# Patient Record
Sex: FEMALE | Race: WHITE | NOT HISPANIC OR LATINO
[De-identification: names, ages, dates, MRNs, and addresses within clinical notes are randomized per-mention and may not be internally consistent; named-entity substitution may affect disease eponyms.]

---

## 2017-07-03 ENCOUNTER — RX ONLY (OUTPATIENT)
Age: 22
Setting detail: RX ONLY
End: 2017-07-03

## 2017-07-03 PROBLEM — L70.0 ACNE VULGARIS: Status: ACTIVE | Noted: 2017-07-03

## 2018-01-01 ENCOUNTER — OFFICE VISIT (OUTPATIENT)
Dept: URGENT CARE | Facility: CLINIC | Age: 23
End: 2018-01-01
Payer: COMMERCIAL

## 2018-01-01 VITALS
BODY MASS INDEX: 20.32 KG/M2 | HEART RATE: 102 BPM | HEIGHT: 64 IN | RESPIRATION RATE: 14 BRPM | SYSTOLIC BLOOD PRESSURE: 106 MMHG | TEMPERATURE: 100 F | OXYGEN SATURATION: 98 % | WEIGHT: 119 LBS | DIASTOLIC BLOOD PRESSURE: 64 MMHG

## 2018-01-01 DIAGNOSIS — J11.1 INFLUENZA: ICD-10-CM

## 2018-01-01 LAB
APPEARANCE UR: NORMAL
BILIRUB UR STRIP-MCNC: NORMAL MG/DL
COLOR UR AUTO: NORMAL
FLUAV+FLUBV AG SPEC QL IA: NORMAL
GLUCOSE UR STRIP.AUTO-MCNC: NORMAL MG/DL
INT CON NEG: NEGATIVE
INT CON POS: POSITIVE
KETONES UR STRIP.AUTO-MCNC: NORMAL MG/DL
LEUKOCYTE ESTERASE UR QL STRIP.AUTO: NORMAL
NITRITE UR QL STRIP.AUTO: NORMAL
PH UR STRIP.AUTO: 6 [PH] (ref 5–8)
PROT UR QL STRIP: NORMAL MG/DL
RBC UR QL AUTO: NORMAL
SP GR UR STRIP.AUTO: 1.02
UROBILINOGEN UR STRIP-MCNC: 0.2 MG/DL

## 2018-01-01 PROCEDURE — 81002 URINALYSIS NONAUTO W/O SCOPE: CPT | Performed by: PHYSICIAN ASSISTANT

## 2018-01-01 PROCEDURE — 99214 OFFICE O/P EST MOD 30 MIN: CPT | Performed by: PHYSICIAN ASSISTANT

## 2018-01-01 PROCEDURE — 87804 INFLUENZA ASSAY W/OPTIC: CPT | Performed by: PHYSICIAN ASSISTANT

## 2018-01-01 RX ORDER — IBUPROFEN 200 MG
200 TABLET ORAL EVERY 6 HOURS PRN
COMMUNITY
End: 2020-10-09

## 2018-01-01 RX ORDER — OSELTAMIVIR PHOSPHATE 75 MG/1
75 CAPSULE ORAL 2 TIMES DAILY
Qty: 10 CAP | Refills: 0 | Status: SHIPPED | OUTPATIENT
Start: 2018-01-01 | End: 2018-01-06

## 2018-01-01 ASSESSMENT — ENCOUNTER SYMPTOMS
COUGH: 1
SPUTUM PRODUCTION: 1
HEADACHES: 1
WHEEZING: 0
HEMOPTYSIS: 0
SINUS PAIN: 1
FEVER: 0
CHILLS: 1
FLANK PAIN: 1
SHORTNESS OF BREATH: 0
SORE THROAT: 1
PALPITATIONS: 0

## 2018-01-01 NOTE — LETTER
January 1, 2018         Patient: Maria E Cisneros   YOB: 1995   Date of Visit: 1/1/2018           To Whom it May Concern:    Maria E Cisneros was seen in my clinic on 1/1/2018.  Please excuse her from work 1/3-1/4/2018    If you have any questions or concerns, please don't hesitate to call.        Sincerely,           Tayo Garcia P.A.-C.  Electronically Signed

## 2018-01-02 NOTE — PATIENT INSTRUCTIONS
"Influenza, Adult  Influenza (\"the flu\") is a viral infection of the respiratory tract. It occurs more often in winter months because people spend more time in close contact with one another. Influenza can make you feel very sick. Influenza easily spreads from person to person (contagious).  CAUSES   Influenza is caused by a virus that infects the respiratory tract. You can catch the virus by breathing in droplets from an infected person's cough or sneeze. You can also catch the virus by touching something that was recently contaminated with the virus and then touching your mouth, nose, or eyes.  RISKS AND COMPLICATIONS  You may be at risk for a more severe case of influenza if you smoke cigarettes, have diabetes, have chronic heart disease (such as heart failure) or lung disease (such as asthma), or if you have a weakened immune system. Elderly people and pregnant women are also at risk for more serious infections. The most common problem of influenza is a lung infection (pneumonia). Sometimes, this problem can require emergency medical care and may be life threatening.  SIGNS AND SYMPTOMS   Symptoms typically last 4 to 10 days and may include:  · Fever.  · Chills.  · Headache, body aches, and muscle aches.  · Sore throat.  · Chest discomfort and cough.  · Poor appetite.  · Weakness or feeling tired.  · Dizziness.  · Nausea or vomiting.  DIAGNOSIS   Diagnosis of influenza is often made based on your history and a physical exam. A nose or throat swab test can be done to confirm the diagnosis.  TREATMENT   In mild cases, influenza goes away on its own. Treatment is directed at relieving symptoms. For more severe cases, your health care provider may prescribe antiviral medicines to shorten the sickness. Antibiotic medicines are not effective because the infection is caused by a virus, not by bacteria.  HOME CARE INSTRUCTIONS  · Take medicines only as directed by your health care provider.  · Use a cool mist humidifier " to make breathing easier.  · Get plenty of rest until your temperature returns to normal. This usually takes 3 to 4 days.  · Drink enough fluid to keep your urine clear or pale yellow.  · Cover your mouth and nose when coughing or sneezing, and wash your hands well to prevent the virus from spreading.  · Stay home from work or school until the fever is gone for at least 1 full day.  PREVENTION   An annual influenza vaccination (flu shot) is the best way to avoid getting influenza. An annual flu shot is now routinely recommended for all adults in the [place]  SEEK MEDICAL CARE IF:  · You experience chest pain, your cough worsens, or you produce more mucus.  · You have nausea, vomiting, or diarrhea.  · Your fever returns or gets worse.  SEEK IMMEDIATE MEDICAL CARE IF:  · You have trouble breathing, you become short of breath, or your skin or nails become bluish.  · You have severe pain or stiffness in the neck.  · You develop a sudden headache, or pain in the face or ear.  · You have nausea or vomiting that you cannot control.  MAKE SURE YOU:   · Understand these instructions.  · Will watch your condition.  · Will get help right away if you are not doing well or get worse.     This information is not intended to replace advice given to you by your health care provider. Make sure you discuss any questions you have with your health care provider.     Document Released: 12/15/2001 Document Revised: 01/08/2016 Document Reviewed: 03/18/2013  [employer] Interactive Patient Education ©2016 [employer]

## 2018-01-02 NOTE — PROGRESS NOTES
Subjective:      Maria E Cisneros is a 22 y.o. female who presents with URI (uri symptoms x 3 days . )            URI    This is a new problem. The current episode started yesterday. The problem has been unchanged. The maximum temperature recorded prior to her arrival was 100.4 - 100.9 F. Associated symptoms include congestion, coughing, headaches, joint pain, sinus pain, sneezing and a sore throat. Pertinent negatives include no chest pain, ear pain or wheezing. She has tried decongestant for the symptoms. The treatment provided no relief.       Review of Systems   Constitutional: Positive for chills and malaise/fatigue. Negative for fever.   HENT: Positive for congestion, sinus pain, sneezing and sore throat. Negative for ear pain.    Respiratory: Positive for cough and sputum production. Negative for hemoptysis, shortness of breath and wheezing.    Cardiovascular: Negative for chest pain and palpitations.   Genitourinary: Positive for flank pain.   Musculoskeletal: Positive for joint pain.   Neurological: Positive for headaches.   All other systems reviewed and are negative.    PMH:  has a past medical history of Depression. She also has no past medical history of Diabetes.  MEDS:   Current Outpatient Prescriptions:   •  ALBUTEROL INH, Inhale  by mouth., Disp: , Rfl:   •  ibuprofen (MOTRIN) 200 MG Tab, Take 200 mg by mouth every 6 hours as needed., Disp: , Rfl:   •  Levonorgestrel (MIRENA, 52 MG, IU), by Intrauterine route., Disp: , Rfl:   •  fluoxetine (PROZAC) 20 MG CAPS, Take 20 mg by mouth every day., Disp: , Rfl:   •  benzonatate (TESSALON) 100 MG Cap, Take 1 Cap by mouth 3 times a day as needed for Cough., Disp: 60 Cap, Rfl: 0  •  azithromycin (ZITHROMAX) 250 MG Tab, uad, Disp: 6 Tab, Rfl: 0  •  fluoxetine (PROZAC) 10 MG CAPS, Take 60 mg by mouth every day., Disp: , Rfl:   •  clonazepam (KLONOPIN) 0.5 MG TABS, Take 0.25 mg by mouth 2 times a day., Disp: , Rfl:   •  Non Formulary Request, Pt. Is also taking  "flurocortizone, Disp: , Rfl:   •  sertraline (ZOLOFT) 100 MG TABS, Take 100 mg by mouth every day.  , Disp: , Rfl:   •  theophylline SR (THEODUR) 200 MG TB12, Take 200 mg by mouth 2 times a day.  , Disp: , Rfl:   •  lorazepam (ATIVAN) 0.5 MG TABS, Take 1 mg by mouth every four hours as needed.  , Disp: , Rfl:   ALLERGIES:   Allergies   Allergen Reactions   • Codeine Vomiting     SURGHX: History reviewed. No pertinent surgical history.  SOCHX:  reports that she has never smoked. She has never used smokeless tobacco. She reports that she does not drink alcohol or use drugs.  FH: Family history was reviewed, no pertinent findings to report  Medications, Allergies, and current problem list reviewed today in Epic     Objective:     /64   Pulse (!) 102   Temp 37.8 °C (100 °F)   Resp 14   Ht 1.626 m (5' 4\")   Wt 54 kg (119 lb)   LMP 01/01/2016   SpO2 98%   BMI 20.43 kg/m²      Physical Exam   Constitutional: She is oriented to person, place, and time. She appears well-developed and well-nourished. She is active.  Non-toxic appearance. She does not have a sickly appearance. She does not appear ill. No distress. She is not intubated.   HENT:   Head: Normocephalic and atraumatic.   Right Ear: Hearing, tympanic membrane, external ear and ear canal normal.   Left Ear: Hearing, tympanic membrane, external ear and ear canal normal.   Nose: Nose normal.   Mouth/Throat: Uvula is midline, oropharynx is clear and moist and mucous membranes are normal.   Eyes: Conjunctivae, EOM and lids are normal.   Neck: Normal range of motion. Neck supple.   Cardiovascular: Regular rhythm, S1 normal, S2 normal and normal heart sounds.  Exam reveals no gallop and no friction rub.    No murmur heard.  Pulmonary/Chest: Effort normal and breath sounds normal. No accessory muscle usage. No apnea, no tachypnea and no bradypnea. She is not intubated. No respiratory distress. She has no decreased breath sounds. She has no wheezes. She has no " rhonchi. She has no rales. She exhibits no tenderness.   Musculoskeletal: Normal range of motion.   Neurological: She is alert and oriented to person, place, and time.   Skin: Skin is warm and dry.   Psychiatric: She has a normal mood and affect. Her speech is normal and behavior is normal. Judgment and thought content normal.   Vitals reviewed.           Rapid Flu: POS A  Assessment/Plan:     1. Influenza  POCT Influenza A/B    POCT Urinalysis    oseltamivir (TAMIFLU) 75 MG Cap         Differential diagnosis, natural history, supportive care, and indications for immediate follow-up discussed at length.   Follow-up with primary care provider within 4-5 days, emergency room precautions discussed.  Patient and/or family appears understanding of information.

## 2019-03-28 ENCOUNTER — OFFICE VISIT (OUTPATIENT)
Dept: URGENT CARE | Facility: CLINIC | Age: 24
End: 2019-03-28
Payer: COMMERCIAL

## 2019-03-28 VITALS
TEMPERATURE: 97.8 F | DIASTOLIC BLOOD PRESSURE: 82 MMHG | RESPIRATION RATE: 12 BRPM | HEIGHT: 64 IN | HEART RATE: 84 BPM | SYSTOLIC BLOOD PRESSURE: 120 MMHG | WEIGHT: 126.2 LBS | BODY MASS INDEX: 21.54 KG/M2 | OXYGEN SATURATION: 99 %

## 2019-03-28 DIAGNOSIS — J01.00 ACUTE NON-RECURRENT MAXILLARY SINUSITIS: ICD-10-CM

## 2019-03-28 PROCEDURE — 99214 OFFICE O/P EST MOD 30 MIN: CPT | Performed by: PHYSICIAN ASSISTANT

## 2019-03-28 RX ORDER — AMOXICILLIN AND CLAVULANATE POTASSIUM 875; 125 MG/1; MG/1
1 TABLET, FILM COATED ORAL 2 TIMES DAILY
Qty: 20 TAB | Refills: 0 | Status: SHIPPED | OUTPATIENT
Start: 2019-03-28 | End: 2019-04-07

## 2019-03-28 ASSESSMENT — ENCOUNTER SYMPTOMS
FEVER: 0
NAUSEA: 0
DIARRHEA: 0
CHILLS: 0
SINUS PAIN: 1
EYE DISCHARGE: 0
HEADACHES: 1
SINUS PRESSURE: 1
SORE THROAT: 1
VOMITING: 0
MYALGIAS: 0
COUGH: 0
ABDOMINAL PAIN: 0
CONSTIPATION: 0
SHORTNESS OF BREATH: 0
DIAPHORESIS: 0
EYE PAIN: 0

## 2019-03-28 NOTE — LETTER
March 28, 2019         Patient: Maria E Cisneros   YOB: 1995   Date of Visit: 3/28/2019           To Whom it May Concern:    Maria E Cisneros was seen in my clinic on 3/28/2019. Please excuse her from work on 3/28/19.    If you have any questions or concerns, please don't hesitate to call.        Sincerely,           Sanchez Aguilera  Electronically Signed

## 2019-03-28 NOTE — PROGRESS NOTES
Subjective:   Maria E Cisneros is a 23 y.o. female who presents for Pharyngitis (X congested sore throat, diff breathing )       Sinus Problem   This is a new problem. Episode onset: 1 week ago. The problem has been gradually worsening since onset. There has been no fever. The pain is moderate. Associated symptoms include congestion, headaches, sinus pressure and a sore throat. Pertinent negatives include no chills, coughing, diaphoresis, ear pain or shortness of breath. Past treatments include spray decongestants. The treatment provided mild relief.     Review of Systems   Constitutional: Negative for chills, diaphoresis and fever.   HENT: Positive for congestion, sinus pain, sinus pressure and sore throat. Negative for ear discharge and ear pain.    Eyes: Negative for pain and discharge.   Respiratory: Negative for cough and shortness of breath.    Cardiovascular: Negative for chest pain.   Gastrointestinal: Negative for abdominal pain, constipation, diarrhea, nausea and vomiting.   Musculoskeletal: Negative for myalgias.   Neurological: Positive for headaches.   All other systems reviewed and are negative.      PMH:  has a past medical history of Depression. She also has no past medical history of Diabetes.    MEDS:   Current Outpatient Prescriptions:   •  amoxicillin-clavulanate (AUGMENTIN) 875-125 MG Tab, Take 1 Tab by mouth 2 times a day for 10 days., Disp: 20 Tab, Rfl: 0  •  fluoxetine (PROZAC) 10 MG CAPS, Take 60 mg by mouth every day., Disp: , Rfl:   •  clonazepam (KLONOPIN) 0.5 MG TABS, Take 0.25 mg by mouth 2 times a day., Disp: , Rfl:   •  ALBUTEROL INH, Inhale  by mouth., Disp: , Rfl:   •  ibuprofen (MOTRIN) 200 MG Tab, Take 200 mg by mouth every 6 hours as needed., Disp: , Rfl:   •  Levonorgestrel (MIRENA, 52 MG, IU), by Intrauterine route., Disp: , Rfl:   •  benzonatate (TESSALON) 100 MG Cap, Take 1 Cap by mouth 3 times a day as needed for Cough. (Patient not taking: Reported on 3/28/2019), Disp: 60  "Cap, Rfl: 0  •  azithromycin (ZITHROMAX) 250 MG Tab, uad (Patient not taking: Reported on 3/28/2019), Disp: 6 Tab, Rfl: 0  •  fluoxetine (PROZAC) 20 MG CAPS, Take 20 mg by mouth every day., Disp: , Rfl:   •  Non Formulary Request, Pt. Is also taking flurocortizone, Disp: , Rfl:   •  sertraline (ZOLOFT) 100 MG TABS, Take 100 mg by mouth every day.  , Disp: , Rfl:   •  theophylline SR (THEODUR) 200 MG TB12, Take 200 mg by mouth 2 times a day.  , Disp: , Rfl:   •  lorazepam (ATIVAN) 0.5 MG TABS, Take 1 mg by mouth every four hours as needed.  , Disp: , Rfl:     ALLERGIES:   Allergies   Allergen Reactions   • Codeine Vomiting       SURGHX: No past surgical history on file.    SOCHX:  reports that she has never smoked. She has never used smokeless tobacco. She reports that she does not drink alcohol or use drugs.    FH: Reviewed with patient, not pertinent to this visit.     Objective:   /82 (BP Location: Left arm, Patient Position: Sitting, BP Cuff Size: Adult)   Pulse 84   Temp 36.6 °C (97.8 °F) (Temporal)   Resp 12   Ht 1.626 m (5' 4\")   Wt 57.2 kg (126 lb 3.2 oz)   SpO2 99%   BMI 21.66 kg/m²   Physical Exam   Constitutional: She is oriented to person, place, and time. She appears well-developed and well-nourished. No distress.   HENT:   Head: Normocephalic and atraumatic.   Right Ear: Tympanic membrane, external ear and ear canal normal.   Left Ear: Tympanic membrane, external ear and ear canal normal.   Nose: Mucosal edema present. Right sinus exhibits maxillary sinus tenderness. Left sinus exhibits maxillary sinus tenderness.   Mouth/Throat: Uvula is midline and mucous membranes are normal. Posterior oropharyngeal edema and posterior oropharyngeal erythema present. No oropharyngeal exudate.   Eyes: Pupils are equal, round, and reactive to light. Conjunctivae and EOM are normal.   Neck: Normal range of motion. Neck supple. No tracheal deviation present.   Cardiovascular: Normal rate, regular rhythm and " normal heart sounds.    Pulmonary/Chest: Effort normal and breath sounds normal. No respiratory distress. She has no wheezes. She has no rhonchi. She has no rales.   Musculoskeletal:   ROM normal all four extremities   Lymphadenopathy:     She has no cervical adenopathy.   Neurological: She is alert and oriented to person, place, and time.   Skin: Skin is warm and dry.   Psychiatric: She has a normal mood and affect. Her behavior is normal. Judgment and thought content normal.   Vitals reviewed.      Assessment/Plan:   1. Acute non-recurrent maxillary sinusitis  - amoxicillin-clavulanate (AUGMENTIN) 875-125 MG Tab; Take 1 Tab by mouth 2 times a day for 10 days.  Dispense: 20 Tab; Refill: 0    - Advised to try OTC steroid nasal spray, saline nasal flushes, ibuprofen/acetaminophen prn  - Advised to take abx with food/yogurt and to complete course  - Advised to return if symptoms worsen or do not improve    Differential diagnosis, natural history, supportive care, and indications for immediate follow-up discussed.

## 2020-02-18 ENCOUNTER — OFFICE VISIT (OUTPATIENT)
Dept: URGENT CARE | Facility: CLINIC | Age: 25
End: 2020-02-18
Payer: COMMERCIAL

## 2020-02-18 VITALS
SYSTOLIC BLOOD PRESSURE: 120 MMHG | DIASTOLIC BLOOD PRESSURE: 70 MMHG | TEMPERATURE: 99.3 F | BODY MASS INDEX: 22.32 KG/M2 | HEART RATE: 88 BPM | HEIGHT: 63 IN | OXYGEN SATURATION: 97 % | RESPIRATION RATE: 16 BRPM | WEIGHT: 126 LBS

## 2020-02-18 DIAGNOSIS — J06.9 URI WITH COUGH AND CONGESTION: ICD-10-CM

## 2020-02-18 DIAGNOSIS — J10.1 INFLUENZA A: Primary | ICD-10-CM

## 2020-02-18 PROBLEM — H90.5 SENSORINEURAL HEARING LOSS: Status: ACTIVE | Noted: 2020-02-18

## 2020-02-18 PROBLEM — J45.909 EXTRINSIC ASTHMA: Status: ACTIVE | Noted: 2020-02-18

## 2020-02-18 LAB
FLUAV+FLUBV AG SPEC QL IA: NORMAL
INT CON NEG: NORMAL
INT CON POS: NORMAL

## 2020-02-18 PROCEDURE — 99214 OFFICE O/P EST MOD 30 MIN: CPT | Performed by: PHYSICIAN ASSISTANT

## 2020-02-18 PROCEDURE — 87804 INFLUENZA ASSAY W/OPTIC: CPT | Performed by: PHYSICIAN ASSISTANT

## 2020-02-18 RX ORDER — DEXTROMETHORPHAN HYDROBROMIDE AND PROMETHAZINE HYDROCHLORIDE 15; 6.25 MG/5ML; MG/5ML
5 SYRUP ORAL EVERY 4 HOURS PRN
Qty: 120 ML | Refills: 0 | Status: SHIPPED | OUTPATIENT
Start: 2020-02-18 | End: 2020-10-09

## 2020-02-18 RX ORDER — AZELASTINE 1 MG/ML
SPRAY, METERED NASAL
COMMUNITY
Start: 2020-01-17 | End: 2020-10-09

## 2020-02-18 RX ORDER — ALBUTEROL SULFATE 90 UG/1
1-2 AEROSOL, METERED RESPIRATORY (INHALATION)
COMMUNITY
Start: 2013-12-26 | End: 2020-10-09

## 2020-02-18 RX ORDER — METHYLPREDNISOLONE 4 MG/1
TABLET ORAL
Qty: 21 TAB | Refills: 0 | Status: SHIPPED | OUTPATIENT
Start: 2020-02-18 | End: 2020-10-09

## 2020-02-18 RX ORDER — OSELTAMIVIR PHOSPHATE 75 MG/1
75 CAPSULE ORAL 2 TIMES DAILY
Qty: 10 CAP | Refills: 0 | Status: SHIPPED | OUTPATIENT
Start: 2020-02-18 | End: 2020-02-23

## 2020-02-18 NOTE — LETTER
February 18, 2020       Patient: Maria E Cisneros   YOB: 1995   Date of Visit: 2/18/2020         To Whom It May Concern:    It is my medical opinion that Maria E Cisneros may be excused from work for the dates of 2/18/20-2/21/20.      If you have any questions or concerns, please don't hesitate to call [phone]          Sincerely,          Jose Mayes P.A.-C.  Electronically Signed

## 2020-02-19 NOTE — PROGRESS NOTES
Subjective:      Pt is a 24 y.o. female who presents with Pharyngitis (coughing)            HPI  This is a new problem. PT presents to  clinic today complaining of sore throat, watery eyes, pressure in ears, cough, fatigue, runny nose, post nasal drip, sinus pressure and headache. PT denies CP, SOB, NVD, abdominal pain, joint pain. PT states these symptoms began around 2 weeks ago. PT states the pain is a 7/10, aching in nature and worse at night.  Pt has not taken any RX medications for this condition. The pt's medication list, problem list, and allergies have been evaluated and reviewed during today's visit.    PMH:  Past Medical History:   Diagnosis Date   • Depression        PSH:  Negative per pt.      Fam Hx:  the patient's family history is not pertinent to their current complaint    Soc HX:  Social History     Socioeconomic History   • Marital status: Single     Spouse name: Not on file   • Number of children: Not on file   • Years of education: Not on file   • Highest education level: Not on file   Occupational History   • Not on file   Social Needs   • Financial resource strain: Not on file   • Food insecurity     Worry: Not on file     Inability: Not on file   • Transportation needs     Medical: Not on file     Non-medical: Not on file   Tobacco Use   • Smoking status: Never Smoker   • Smokeless tobacco: Never Used   Substance and Sexual Activity   • Alcohol use: No   • Drug use: No   • Sexual activity: Not on file   Lifestyle   • Physical activity     Days per week: Not on file     Minutes per session: Not on file   • Stress: Not on file   Relationships   • Social connections     Talks on phone: Not on file     Gets together: Not on file     Attends Sikh service: Not on file     Active member of club or organization: Not on file     Attends meetings of clubs or organizations: Not on file     Relationship status: Not on file   • Intimate partner violence     Fear of current or ex partner: Not on  file     Emotionally abused: Not on file     Physically abused: Not on file     Forced sexual activity: Not on file   Other Topics Concern   • Not on file   Social History Narrative    ** Merged History Encounter **              Medications:    Current Outpatient Medications:   •  Norgestim-Eth Estrad Triphasic (TRI-SPRINTEC PO), Take  by mouth., Disp: , Rfl:   •  ibuprofen (MOTRIN) 200 MG Tab, Take 200 mg by mouth every 6 hours as needed., Disp: , Rfl:   •  fluoxetine (PROZAC) 20 MG CAPS, Take 20 mg by mouth every day., Disp: , Rfl:   •  ALBUTEROL INH, Inhale  by mouth., Disp: , Rfl:   •  Levonorgestrel (MIRENA, 52 MG, IU), by Intrauterine route., Disp: , Rfl:   •  benzonatate (TESSALON) 100 MG Cap, Take 1 Cap by mouth 3 times a day as needed for Cough. (Patient not taking: Reported on 3/28/2019), Disp: 60 Cap, Rfl: 0  •  azithromycin (ZITHROMAX) 250 MG Tab, uad (Patient not taking: Reported on 3/28/2019), Disp: 6 Tab, Rfl: 0  •  fluoxetine (PROZAC) 10 MG CAPS, Take 60 mg by mouth every day., Disp: , Rfl:   •  clonazepam (KLONOPIN) 0.5 MG TABS, Take 0.25 mg by mouth 2 times a day., Disp: , Rfl:   •  Non Formulary Request, Pt. Is also taking flurocortizone, Disp: , Rfl:   •  sertraline (ZOLOFT) 100 MG TABS, Take 100 mg by mouth every day.  , Disp: , Rfl:   •  theophylline SR (THEODUR) 200 MG TB12, Take 200 mg by mouth 2 times a day.  , Disp: , Rfl:   •  lorazepam (ATIVAN) 0.5 MG TABS, Take 1 mg by mouth every four hours as needed.  , Disp: , Rfl:       Allergies:  Codeine          ROS  Review of Systems   Constitutional: Positive for malaise/fatigue. Negative for fever.   HENT: Positive for congestion and sore throat from postnasal drip with associated sinus pressure and fullness.    Eyes: Negative for blurred vision, double vision and photophobia.   Respiratory: Positive for cough and sputum production. Negative for hemoptysis, shortness of breath and wheezing.    Cardiovascular: Negative for chest pain and  palpitations.   Gastrointestinal: Negative for nausea, vomiting, abdominal pain, diarrhea and constipation.   Genitourinary: Negative for dysuria and flank pain.   Musculoskeletal: Negative for joint pain and myalgias.   Skin: Negative for itching and rash.   Neurological: Positive for headaches. Negative for dizziness and tingling.   Endo/Heme/Allergies: Does not bruise/bleed easily.   Psychiatric/Behavioral: Negative for depression. The patient is not nervous/anxious.           Objective:     Wt 57.2 kg (126 lb)   BMI 21.63 kg/m²      Physical Exam        Physical Exam   Constitutional: Pt is oriented to person, place, and time. Pt appears well-developed and well-nourished. No distress.   HENT:   Head: Normocephalic and atraumatic.   Right Ear: Hearing, tympanic membrane, external ear and ear canal normal.   Left Ear: Hearing, tympanic membrane, external ear and ear canal normal.   Nose: Mucosal edema, rhinorrhea and sinus tenderness present. No nose lacerations, nasal deformity, septal deviation or nasal septal hematoma. No epistaxis.  No foreign bodies. Right sinus exhibits maxillary sinus tenderness and frontal sinus tenderness. Left sinus exhibits maxillary sinus tenderness and frontal sinus tenderness.   Mouth/Throat: Oropharynx is clear and moist. No oropharyngeal exudate.   Eyes: Conjunctivae normal and EOM are normal. Pupils are equal, round, and reactive to light. Right eye exhibits no discharge. Left eye exhibits no discharge.   Neck: Normal range of motion. Neck supple. No tracheal deviation present. No thyromegaly present.   Cardiovascular: Normal rate, regular rhythm, normal heart sounds and intact distal pulses.  Exam reveals no gallop and no friction rub.    No murmur heard.  Pulmonary/Chest: Effort normal and breath sounds normal. No respiratory distress. Pt has no wheezes. Pt has no rales. Pt exhibits no tenderness.   Abdominal: Soft. Bowel sounds are normal. Pt exhibits no distension and no  mass. There is no tenderness. There is no rebound and no guarding.   Musculoskeletal: Normal range of motion. Pt exhibits no edema and no tenderness.   Lymphadenopathy:     Pt has no cervical adenopathy.   Neurological: Pt is alert and oriented to person, place, and time. Pt has normal reflexes. Pt displays normal reflexes. No cranial nerve deficit. Pt exhibits normal muscle tone. Coordination normal.   Skin: Skin is warm and dry. No rash noted. No erythema.   Psychiatric: Pt has a normal mood and affect. Pt behavior is normal. Judgment and thought content normal.            Assessment/Plan:       1. Influenza A    2. URI with cough and congestion    POC flu-->POS A    Tamiflu  Medrol  Pack  Pro-DM cough syrup  Concern for worsening symptoms of URI which shortly could transition to pneumonia and worsening sinus congestion and infection with powerful cough keeping pt up at night as they must sleep upright to avoid coughing fits.  Diff DX: Bronchitis, Sinusitis, Pneumonia, Influenza, Viral URI, Allergies  Rest, fluids encouraged.  OTC decongestant for congestion/cough  Note given for work.  AVS with medical info given.  Pt was in full understanding and agreement with the plan.  Differential diagnosis, natural history, supportive care, and indications for immediate follow-up discussed. All questions answered. Patient agrees with the plan of care.  Follow-up as needed if symptoms worsen or fail to improve to PCP, Urgent care or Emergency Room.

## 2020-10-03 ENCOUNTER — HOSPITAL ENCOUNTER (EMERGENCY)
Facility: MEDICAL CENTER | Age: 25
End: 2020-10-04
Attending: EMERGENCY MEDICINE
Payer: COMMERCIAL

## 2020-10-03 ENCOUNTER — APPOINTMENT (OUTPATIENT)
Dept: RADIOLOGY | Facility: MEDICAL CENTER | Age: 25
End: 2020-10-03
Attending: EMERGENCY MEDICINE
Payer: COMMERCIAL

## 2020-10-03 DIAGNOSIS — Z20.822 SUSPECTED COVID-19 VIRUS INFECTION: ICD-10-CM

## 2020-10-03 DIAGNOSIS — J06.9 UPPER RESPIRATORY TRACT INFECTION, UNSPECIFIED TYPE: ICD-10-CM

## 2020-10-03 LAB
ALBUMIN SERPL BCP-MCNC: 4 G/DL (ref 3.2–4.9)
ALBUMIN/GLOB SERPL: 1.3 G/DL
ALP SERPL-CCNC: 70 U/L (ref 30–99)
ALT SERPL-CCNC: 18 U/L (ref 2–50)
ANION GAP SERPL CALC-SCNC: 13 MMOL/L (ref 7–16)
AST SERPL-CCNC: 21 U/L (ref 12–45)
BASOPHILS # BLD AUTO: 0.3 % (ref 0–1.8)
BASOPHILS # BLD: 0.04 K/UL (ref 0–0.12)
BILIRUB SERPL-MCNC: 0.4 MG/DL (ref 0.1–1.5)
BUN SERPL-MCNC: 6 MG/DL (ref 8–22)
CALCIUM SERPL-MCNC: 9 MG/DL (ref 8.5–10.5)
CHLORIDE SERPL-SCNC: 101 MMOL/L (ref 96–112)
CO2 SERPL-SCNC: 21 MMOL/L (ref 20–33)
COVID ORDER STATUS COVID19: NORMAL
CREAT SERPL-MCNC: 0.75 MG/DL (ref 0.5–1.4)
D DIMER PPP IA.FEU-MCNC: 1.74 UG/ML (FEU) (ref 0–0.5)
EOSINOPHIL # BLD AUTO: 0.03 K/UL (ref 0–0.51)
EOSINOPHIL NFR BLD: 0.2 % (ref 0–6.9)
ERYTHROCYTE [DISTWIDTH] IN BLOOD BY AUTOMATED COUNT: 36.7 FL (ref 35.9–50)
GLOBULIN SER CALC-MCNC: 3 G/DL (ref 1.9–3.5)
GLUCOSE SERPL-MCNC: 113 MG/DL (ref 65–99)
HCG SERPL QL: NEGATIVE
HCT VFR BLD AUTO: 40.3 % (ref 37–47)
HGB BLD-MCNC: 14.1 G/DL (ref 12–16)
IMM GRANULOCYTES # BLD AUTO: 0.04 K/UL (ref 0–0.11)
IMM GRANULOCYTES NFR BLD AUTO: 0.3 % (ref 0–0.9)
LYMPHOCYTES # BLD AUTO: 1.66 K/UL (ref 1–4.8)
LYMPHOCYTES NFR BLD: 13.6 % (ref 22–41)
MCH RBC QN AUTO: 30.8 PG (ref 27–33)
MCHC RBC AUTO-ENTMCNC: 35 G/DL (ref 33.6–35)
MCV RBC AUTO: 88 FL (ref 81.4–97.8)
MONOCYTES # BLD AUTO: 0.91 K/UL (ref 0–0.85)
MONOCYTES NFR BLD AUTO: 7.5 % (ref 0–13.4)
NEUTROPHILS # BLD AUTO: 9.53 K/UL (ref 2–7.15)
NEUTROPHILS NFR BLD: 78.1 % (ref 44–72)
NRBC # BLD AUTO: 0 K/UL
NRBC BLD-RTO: 0 /100 WBC
PLATELET # BLD AUTO: 228 K/UL (ref 164–446)
PMV BLD AUTO: 11 FL (ref 9–12.9)
POTASSIUM SERPL-SCNC: 3.5 MMOL/L (ref 3.6–5.5)
PROT SERPL-MCNC: 7 G/DL (ref 6–8.2)
RBC # BLD AUTO: 4.58 M/UL (ref 4.2–5.4)
SODIUM SERPL-SCNC: 135 MMOL/L (ref 135–145)
TROPONIN T SERPL-MCNC: <6 NG/L (ref 6–19)
WBC # BLD AUTO: 12.2 K/UL (ref 4.8–10.8)

## 2020-10-03 PROCEDURE — C9803 HOPD COVID-19 SPEC COLLECT: HCPCS | Performed by: EMERGENCY MEDICINE

## 2020-10-03 PROCEDURE — 80053 COMPREHEN METABOLIC PANEL: CPT

## 2020-10-03 PROCEDURE — 85379 FIBRIN DEGRADATION QUANT: CPT

## 2020-10-03 PROCEDURE — 93005 ELECTROCARDIOGRAM TRACING: CPT

## 2020-10-03 PROCEDURE — 99284 EMERGENCY DEPT VISIT MOD MDM: CPT

## 2020-10-03 PROCEDURE — U0003 INFECTIOUS AGENT DETECTION BY NUCLEIC ACID (DNA OR RNA); SEVERE ACUTE RESPIRATORY SYNDROME CORONAVIRUS 2 (SARS-COV-2) (CORONAVIRUS DISEASE [COVID-19]), AMPLIFIED PROBE TECHNIQUE, MAKING USE OF HIGH THROUGHPUT TECHNOLOGIES AS DESCRIBED BY CMS-2020-01-R: HCPCS

## 2020-10-03 PROCEDURE — 93005 ELECTROCARDIOGRAM TRACING: CPT | Performed by: EMERGENCY MEDICINE

## 2020-10-03 PROCEDURE — 71045 X-RAY EXAM CHEST 1 VIEW: CPT

## 2020-10-03 PROCEDURE — 84484 ASSAY OF TROPONIN QUANT: CPT

## 2020-10-03 PROCEDURE — 84703 CHORIONIC GONADOTROPIN ASSAY: CPT

## 2020-10-03 PROCEDURE — 85025 COMPLETE CBC W/AUTO DIFF WBC: CPT

## 2020-10-04 ENCOUNTER — APPOINTMENT (OUTPATIENT)
Dept: RADIOLOGY | Facility: MEDICAL CENTER | Age: 25
End: 2020-10-04
Attending: EMERGENCY MEDICINE
Payer: COMMERCIAL

## 2020-10-04 VITALS
DIASTOLIC BLOOD PRESSURE: 57 MMHG | RESPIRATION RATE: 16 BRPM | BODY MASS INDEX: 22.32 KG/M2 | HEART RATE: 96 BPM | TEMPERATURE: 97.1 F | OXYGEN SATURATION: 97 % | WEIGHT: 126 LBS | SYSTOLIC BLOOD PRESSURE: 107 MMHG | HEIGHT: 63 IN

## 2020-10-04 LAB
EKG IMPRESSION: NORMAL
SARS-COV-2 RNA RESP QL NAA+PROBE: NOTDETECTED
SPECIMEN SOURCE: NORMAL

## 2020-10-04 PROCEDURE — 700117 HCHG RX CONTRAST REV CODE 255: Performed by: EMERGENCY MEDICINE

## 2020-10-04 PROCEDURE — A9270 NON-COVERED ITEM OR SERVICE: HCPCS | Performed by: EMERGENCY MEDICINE

## 2020-10-04 PROCEDURE — 71275 CT ANGIOGRAPHY CHEST: CPT

## 2020-10-04 PROCEDURE — 700105 HCHG RX REV CODE 258: Performed by: EMERGENCY MEDICINE

## 2020-10-04 PROCEDURE — 700102 HCHG RX REV CODE 250 W/ 637 OVERRIDE(OP): Performed by: EMERGENCY MEDICINE

## 2020-10-04 RX ORDER — SODIUM CHLORIDE 9 MG/ML
1000 INJECTION, SOLUTION INTRAVENOUS ONCE
Status: COMPLETED | OUTPATIENT
Start: 2020-10-04 | End: 2020-10-04

## 2020-10-04 RX ORDER — ACETAMINOPHEN 500 MG
1000 TABLET ORAL ONCE
Status: COMPLETED | OUTPATIENT
Start: 2020-10-04 | End: 2020-10-04

## 2020-10-04 RX ADMIN — ACETAMINOPHEN 1000 MG: 500 TABLET ORAL at 01:01

## 2020-10-04 RX ADMIN — IOHEXOL 60 ML: 350 INJECTION, SOLUTION INTRAVENOUS at 00:54

## 2020-10-04 RX ADMIN — SODIUM CHLORIDE 1000 ML: 9 INJECTION, SOLUTION INTRAVENOUS at 01:02

## 2020-10-04 NOTE — ED PROVIDER NOTES
"ER Provider Note     Scribed for Sebastián Shepard M.D. by Jeanna Childers. 10/3/2020, 9:20 PM.    Primary Care Provider: None noted  Means of Arrival: Walk-In   History obtained from: Patient  History limited by: None     CHIEF COMPLAINT  Chief Complaint   Patient presents with   • Fever   • Cough   • Chest Pain       HPI  Maria E Cisneros is a 24 y.o. female who presents to the Emergency Department for evaluation of persistent, dry cough with an original onset of 1 day ago. The patient additionally endorses associated fever, mild shortness of breath, and midsternal chest pain when she coughs. She denies any known COVID-19 contact at this time.     REVIEW OF SYSTEMS  See HPI for further details. All other systems are negative. C    PAST MEDICAL HISTORY   has a past medical history of Depression.    SURGICAL HISTORY  patient denies any surgical history    SOCIAL HISTORY  Social History     Tobacco Use   • Smoking status: Never Smoker   • Smokeless tobacco: Never Used   Substance Use Topics   • Alcohol use: No   • Drug use: No      Social History     Substance and Sexual Activity   Drug Use No       FAMILY HISTORY  None noted    CURRENT MEDICATIONS  None pertinent    ALLERGIES  Allergies   Allergen Reactions   • Codeine Vomiting   • Vicodin [Apap-Fd&C Yellow #10 Al Leonard-Hydrocodone]        PHYSICAL EXAM  VITAL SIGNS: /86   Pulse (!) 118   Temp 36 °C (96.8 °F) (Temporal)   Resp 18   Ht 1.6 m (5' 3\")   Wt 57.2 kg (126 lb)   SpO2 97%   BMI 22.32 kg/m²      Constitutional: Alert in no apparent distress.  HENT: No signs of trauma, Bilateral external ears normal, Nose normal.   Eyes: Pupils are equal and reactive, Conjunctiva normal, Non-icteric.   Neck: Normal range of motion, No tenderness, Supple, No stridor.   Lymphatic: No lymphadenopathy noted.   Cardiovascular: tachycardic rate and normal rhythm, no palpable thrill  Thorax & Lungs: No respiratory distress,  No chest tenderness.   Abdomen: Bowel sounds " normal, Soft, No tenderness, No masses, No pulsatile masses. No peritoneal signs.  Skin: Warm, Dry, No erythema, No rash.   Back: No bony tenderness, No CVA tenderness.   Extremities: Intact distal pulses, No edema, No tenderness, No cyanosis.  Musculoskeletal: Good range of motion in all major joints. No tenderness to palpation or major deformities noted.   Neurologic: Alert , Normal motor function, Normal sensory function, No focal deficits noted.   Psychiatric: Affect normal, Judgment normal, Mood normal.     DIAGNOSTIC STUDIES / PROCEDURES    EKG  Interpreted by me  12 Lead EKG interpreted by me to show:  tachycardic rhythm  Rate 116  Non specific T wave changes  No significant ST elevation or depression  Axis: Normal  My impression of this EKG: Does not indicate ischemia or arrhythmia at this time.    LABS  Labs Reviewed   CBC WITH DIFFERENTIAL - Abnormal; Notable for the following components:       Result Value    WBC 12.2 (*)     Neutrophils-Polys 78.10 (*)     Lymphocytes 13.60 (*)     Neutrophils (Absolute) 9.53 (*)     Monos (Absolute) 0.91 (*)     All other components within normal limits   COMP METABOLIC PANEL - Abnormal; Notable for the following components:    Potassium 3.5 (*)     Glucose 113 (*)     Bun 6 (*)     All other components within normal limits   D-DIMER - Abnormal; Notable for the following components:    D-Dimer Screen 1.74 (*)     All other components within normal limits   TROPONIN   HCG QUAL SERUM   COVID/SARS COV-2    Narrative:     Is patient being admitted?->No  Expected turn around time?->Routine (In-House PCR up to 24  hours)  Is this the patients First SARS CoV-2 test?->Unknown  Is this patient employed in healthcare?->Unknown  Is the patient symptomatic as defined by the CDC?->Unknown  Is the patient hospitalized?->No  Is the patient a resident in a congregate care setting?->No  Is the patient pregnant?->Unknown   ESTIMATED GFR   SARS-COV-2, PCR (IN-HOUSE)    Narrative:     Is  patient being admitted?->No  Expected turn around time?->Routine (In-House PCR up to 24  hours)  Is this the patients First SARS CoV-2 test?->Unknown  Is this patient employed in healthcare?->Unknown  Is the patient symptomatic as defined by the CDC?->Unknown  Is the patient hospitalized?->No  Is the patient a resident in a congregate care setting?->No  Is the patient pregnant?->Unknown     All labs reviewed by me.    RADIOLOGY  CT-CTA CHEST PULMONARY ARTERY W/ RECONS   Final Result         No evidence of pulmonary embolus.      Pectus excavatum deformity.         DX-CHEST-PORTABLE (1 VIEW)   Final Result      No acute cardiopulmonary abnormality.         The radiologist's interpretation of all radiological studies have been reviewed by me.    COURSE & MEDICAL DECISION MAKING  Pertinent Labs & Imaging studies reviewed. (See chart for details)    This is a 24 y.o. female that presents with what appears to be COVID-19.  There is some concern with her tachycardia this could represent pulmonary embolism.  I will get a d-dimer as well as a COVID swab and basic labs and then reassess.  EKG is nonischemic..     9:20 PM - Patient seen and examined at bedside. I verified that the patient was wearing a mask and I was wearing appropriate PPE every time I entered the room. The patient's mask was on the patient at all times during my encounter except for a brief view of the oropharynx. Ordered D-dimer, COVID-sars, Beta-HCG qual, CBC with differential, CMP, DX-chest, and Troponin.  Patient will be medicated with Omnipaque, tylenol, and NS infusion for her symptoms.     HYDRATION: Based on the patient's presentation of Dehydration the patient was given IV fluids. IV Hydration was used because oral hydration was not adequate alone. Upon recheck following hydration, the patient was mildly improved.    Patient was found to have no PE.  Chest x-ray is negative.  Her d-dimer was elevated therefore the CT was obtained.  White count is  12.2.  She is no longer tachycardic.  She is feeling improved after fluids.  She will be discharged with strict return precautions and follow-up.    The patient will return for new or worsening symptoms and is stable at the time of discharge.    DISPOSITION:  Patient will be discharged home in stable condition.    FOLLOW UP:  Told to follow-up with the primary care physician       FINAL IMPRESSION  1. Upper respiratory tract infection, unspecified type    2. Suspected COVID-19 virus infection          Jeanna BRENNER (Clarita), am scribing for, and in the presence of, Sebastián Shepard M.D..    Electronically signed by: Jeanna Childers (Clarita), 10/3/2020    ISebastián M.D. personally performed the services described in this documentation, as scribed by Jeanna Childers in my presence, and it is both accurate and complete.     The note accurately reflects work and decisions made by me.  Sebastián Shepard M.D.  10/4/2020  2:56 AM

## 2020-10-04 NOTE — ED TRIAGE NOTES
Pt walked into triage c/o fever x3 days and chest pain.  Pt states CP is a buring/aching in the center of the chest and worse with coughing.  Pt denies known Covid exposure     Pt & staff masked and in appropriate PPE during encounter.    Explained pt the triage process, made pt aware to tell the RN/staff of any changes/concerns, pt verbalized understanding of process and instructions given.  Pt to [facility]

## 2020-10-04 NOTE — DISCHARGE INSTRUCTIONS
You likely have a viral illness and may have COVID-19.    If you develop significant shortness of breath, meaning that it is difficult for you to walk even short distances without having to stop and catch your breath, or you become severely dizzy and this is persistent then please return to the emergency department.

## 2020-10-04 NOTE — ED NOTES
Patient walked with a steady gate at this time to [facility] room 25. Placed patient into gown, on auto bp, on heart monitor, spo2, gave warm blanket, and call light. Ready to be seen  rn at bedside to talk with patient and start iv along with drawing blood

## 2020-10-04 NOTE — ED NOTES
Pt c/o headache and neck pain along with hot flashes, states these are some of the symptoms she experiences normally, order for tylenol and fluids given

## 2020-10-05 ENCOUNTER — TELEPHONE (OUTPATIENT)
Dept: SCHEDULING | Facility: IMAGING CENTER | Age: 25
End: 2020-10-05

## 2020-10-09 ENCOUNTER — TELEMEDICINE (OUTPATIENT)
Dept: MEDICAL GROUP | Facility: IMAGING CENTER | Age: 25
End: 2020-10-09
Payer: COMMERCIAL

## 2020-10-09 VITALS — HEIGHT: 63 IN | TEMPERATURE: 97 F | BODY MASS INDEX: 23.04 KG/M2 | WEIGHT: 130 LBS

## 2020-10-09 DIAGNOSIS — J06.9 VIRAL UPPER RESPIRATORY ILLNESS: ICD-10-CM

## 2020-10-09 DIAGNOSIS — Z76.89 ENCOUNTER TO ESTABLISH CARE WITH NEW DOCTOR: ICD-10-CM

## 2020-10-09 DIAGNOSIS — F33.9 EPISODE OF RECURRENT MAJOR DEPRESSIVE DISORDER, UNSPECIFIED DEPRESSION EPISODE SEVERITY (HCC): ICD-10-CM

## 2020-10-09 PROCEDURE — 99214 OFFICE O/P EST MOD 30 MIN: CPT | Mod: 95,CR | Performed by: NURSE PRACTITIONER

## 2020-10-09 RX ORDER — FLUOXETINE HYDROCHLORIDE 60 MG/1
TABLET, FILM COATED ORAL; ORAL
COMMUNITY
End: 2021-07-07

## 2020-10-09 RX ORDER — NORGESTIMATE AND ETHINYL ESTRADIOL 7DAYSX3 LO
1 KIT ORAL
COMMUNITY
Start: 2020-09-22 | End: 2021-05-06

## 2020-10-09 SDOH — HEALTH STABILITY: MENTAL HEALTH: HOW OFTEN DO YOU HAVE 6 OR MORE DRINKS ON ONE OCCASION?: NEVER

## 2020-10-09 SDOH — HEALTH STABILITY: MENTAL HEALTH: HOW OFTEN DO YOU HAVE A DRINK CONTAINING ALCOHOL?: MONTHLY OR LESS

## 2020-10-09 ASSESSMENT — ANXIETY QUESTIONNAIRES
6. BECOMING EASILY ANNOYED OR IRRITABLE: NOT AT ALL
3. WORRYING TOO MUCH ABOUT DIFFERENT THINGS: NOT AT ALL
GAD7 TOTAL SCORE: 1
7. FEELING AFRAID AS IF SOMETHING AWFUL MIGHT HAPPEN: NOT AT ALL
2. NOT BEING ABLE TO STOP OR CONTROL WORRYING: NOT AT ALL
4. TROUBLE RELAXING: NOT AT ALL
5. BEING SO RESTLESS THAT IT IS HARD TO SIT STILL: NOT AT ALL
1. FEELING NERVOUS, ANXIOUS, OR ON EDGE: SEVERAL DAYS

## 2020-10-09 ASSESSMENT — PATIENT HEALTH QUESTIONNAIRE - PHQ9
SUM OF ALL RESPONSES TO PHQ QUESTIONS 1-9: 1
CLINICAL INTERPRETATION OF PHQ2 SCORE: 1
5. POOR APPETITE OR OVEREATING: 0 - NOT AT ALL

## 2020-10-09 ASSESSMENT — FIBROSIS 4 INDEX: FIB4 SCORE: 0.54

## 2020-10-09 NOTE — PROGRESS NOTES
"Virtual Visit: New Patient   This visit was conducted via Zoom using secure and encrypted videoconferencing technology. The patient was in a private location in the state of [place].    The patient's identity was confirmed and verbal consent was obtained for this virtual visit.  Patient is aware that this is a billable encounter.  Subjective:   CC:   Chief Complaint   Patient presents with   • [facility]     ED 10/03/20     Maria E Cisneros is a 24 y.o. female presenting to establish care and to discuss the evaluation and management of:    Patient was seen in ED on 10/3/2020 for viral symptoms. States she had a fever, fatigue, nasal congestion, non-productive cough, headache, sore throat, abdomen pain, and constipation. States she started to having symptoms on 10/2/2020 and thought it was allergies. States she woke on 10/3 with worse symptoms including fever, \"pounding headache\", and stomach pain. States she took Advil and this minimal improved symptoms. States she completed COVID-19 testing and it was negative. States is overall feeling better. States she continues to have night sweats, but denies any fevers at this time. States she has ongoing nasal congestion. States mucous is clear at this time and denies any increased sinus pressure. States she has a history of sinus infections. States she would like a letter stating she can return to work for her next scheduled shift on Sunday, Oct 11th. Denies any know sick contacts at this time.    Depression:   States that she is currently on 60 mg of Prozac daily. Denies any side effects at this time. States that she has a history of an eating disorder.  States when she was in high school she suffered from anorexia and this continued into her freshman year of college.  States that she seen at [facility], Dr. Shanell Kaur every 6 months.  States that her mood is stable on current regimen.  Denies any counseling at this time.    Depression " Screening  Little interest or pleasure in doing things?  1 - several days   Feeling down, depressed , or hopeless? 0 - not at all   Trouble falling or staying asleep, or sleeping too much?  0 - not at all   Feeling tired or having little energy?  0 - not at all   Poor appetite or overeating?  0 - not at all   Feeling bad about yourself - or that you are a failure or have let yourself or your family down? 0 - not at all   Trouble concentrating on things, such as reading the newspaper or watching television? 0 - not at all   Moving or speaking so slowly that other people could have noticed.  Or the opposite - being so fidgety or restless that you have been moving around a lot more than usual?  0 - not at all   Thoughts that you would be better off dead, or of hurting yourself?  0 - not at all   Patient Health Questionnaire Score: 1     If depressive symptoms identified deferred to follow up visit unless specifically addressed in assesment and plan.    Interpretation of PHQ-9 Total Score   Score Severity   1-4 No Depression   5-9 Mild Depression   10-14 Moderate Depression   15-19 Moderately Severe Depression   20-27 Severe Depression    ROS:  Constitutional: Denies chills, weight loss/gain and/or malaise. Fever, fatigue, night sweats, see HPI.   HENT: Denies hearing loss, enlarged thyroid, or difficulty swallowing. Nasal congestion and sore throat, see HPI.  Eyes: Denies changes in vision, pain. Does wear corrective wear, glasses for reading.  Respiratory: Denies SOB at rest or activity.  Intermittent cough, see HPI.  Cardiovascular: Denies tachycardia, chest pain, palpitations, or leg swelling. Heavy chest, sternum, dry cough.  Gastrointestinal: Denies N/V/D, loss appetite, reflux, or hematochezia. Constipation and abdomen pain, see HPI.  Genitourinary: Denies difficulty voiding, dysuria, nocturia, or hematuria.   Skin: Negative for rash or worrisome moles.   Neurological: Negative for dizziness and/or focal  "weakness. Headache, see HPI.  Endo/Heme/Allergies: Denies bruise/bleed easily, allergies.   Psychiatric/Behavioral: Denies nervous/anxious and/or difficulty sleeping. Depression and history of eating disorder, see HPI.    Allergies   Allergen Reactions   • Codeine Vomiting   • Vicodin [Apap-Fd&C Yellow #10 Al Leonard-Hydrocodone]      Current medicines (including changes today)  Current Outpatient Medications   Medication Sig Dispense Refill   • FLUoxetine HCl 60 MG Tab Take  by mouth.     • TRI-LO-ESTARYLLA 0.18/0.215/0.25 MG-25 MCG Tab Take 1 Tab by mouth every day.       No current facility-administered medications for this visit.      She  has a past medical history of Allergy, Asthma, and Depression.  She  has a past surgical history that includes other orthopedic surgery (2010).    Family History   Problem Relation Age of Onset   • Cancer Mother         breaset cancer, 40's   • Diabetes Father         pre   • Hyperlipidemia Father    • Other Brother         NF, affects brain   • No Known Problems Maternal Grandmother    • No Known Problems Maternal Grandfather    • No Known Problems Paternal Grandmother    • No Known Problems Paternal Grandfather      No family status information on file.     Patient Active Problem List    Diagnosis Date Noted   • Sensorineural hearing loss 02/18/2020   • Extrinsic asthma 02/18/2020   • ASCUS with positive high risk HPV cervical 11/25/2015   • Encounter for insertion of mirena IUD 10/29/2015   • Vasodepressor syncope 08/06/2013   • Family hx-breast malignancy 03/21/2013   • Eating disorder 02/16/2012   • Depression 02/16/2012   • Vaginitis and vulvovaginitis 02/17/2009   • Other acne 02/04/2009   • Dizziness and giddiness 01/23/2009      Objective:   Temp 36.1 °C (97 °F)   Ht 1.6 m (5' 3\")   Wt 59 kg (130 lb) Comment: pt stated  BMI 23.03 kg/m²   Respiratory rate observed during visit: 16 bpm    Physical Exam:  Constitutional: Alert, no distress, well-groomed.  Skin: No rashes " in visible areas.  Eye: Round. Conjunctiva clear, lids normal. No icterus.   ENMT: Lips pink without lesions, good dentition, moist mucous membranes. Phonation is slightly nasal congested sounding.  Neck: No masses, no thyromegaly. Moves freely without pain.  Respiratory: Unlabored respiratory effort, no cough or audible wheeze  Psych: Alert and oriented x3, normal affect and mood.     Assessment and Plan:   1. Encounter to establish care with new doctor  Reviewed with patient medication use and side effects. Medical, past, surgical history reviewed with patient. Discussed with patient the risk and benefits of receiving vaccines. Discussed CDC recommendations for immunizations and USPSTF guidelines for screening exams. Discussed with patient with net lab draw obtaining Varicella titers to check for immunity, verbalized understanding. Encouraged patient to wash hands regularly and avoid sick contacts while supporting immune system with Vitamin C and Zinc.    2. Episode of recurrent major depressive disorder, unspecified depression episode severity (HCC)  This is a chronic stable condition. Instructed to continue seeing psychiatry and to follow POC, verbalized understanding and willingness.    3. Viral upper respiratory illness  This is a stable improving condition. Discussed with patient illness is viral in nature, and antibiotics are not indicated at this time.  Encouraged to increase or maintain hydration, tylenol (500 mg to 1000 mg every 6 hours as tolerated, not to exceed 4g in 24 hours) and/or ibuprofen (200-600 mg TID) for discomfort as tolerated. Letter written that patient may return to work as long as symptoms continue to improve and she does not develop a fever, verbalized understanding. Instructed to notify PCP if she has increased nasal congestion/pressure due to history of sinus infection, verbalized understanding and willingness.    Follow-up: Return if symptoms worsen or fail to improve.

## 2020-10-09 NOTE — LETTER
October 9, 2020    Maria E Cisneros  [address]  [id number]  [place] [address]    To whom it may concern:    Maria E Cisneros is cleared to return to work on 10/11/2020 as long as she has no new onset of  viral symptoms and/or without a fever for 3 days.    If you have any questions or concerns, please don't hesitate to call.    Sincerely,        MIKA Gomez.P.R.N.    Electronically Signed

## 2020-10-26 ENCOUNTER — TELEPHONE (OUTPATIENT)
Dept: MEDICAL GROUP | Facility: IMAGING CENTER | Age: 25
End: 2020-10-26

## 2021-02-05 ENCOUNTER — TELEMEDICINE (OUTPATIENT)
Dept: MEDICAL GROUP | Facility: IMAGING CENTER | Age: 26
End: 2021-02-05
Payer: COMMERCIAL

## 2021-02-05 VITALS — HEIGHT: 63 IN | BODY MASS INDEX: 22.5 KG/M2 | WEIGHT: 127 LBS | HEART RATE: 80 BPM

## 2021-02-05 DIAGNOSIS — Z30.09 FAMILY PLANNING EDUCATION, GUIDANCE, AND COUNSELING: ICD-10-CM

## 2021-02-05 PROCEDURE — 99213 OFFICE O/P EST LOW 20 MIN: CPT | Mod: 95,CR | Performed by: NURSE PRACTITIONER

## 2021-02-05 ASSESSMENT — PAIN SCALES - GENERAL: PAINLEVEL: NO PAIN

## 2021-02-05 ASSESSMENT — FIBROSIS 4 INDEX: FIB4 SCORE: 0.56

## 2021-02-05 NOTE — PROGRESS NOTES
Virtual Visit: Established Patient   This visit was conducted via Zoom using secure and encrypted videoconferencing technology. The patient was in a private location in the state of [place].    The patient's identity was confirmed and verbal consent was obtained for this virtual visit.  Subjective:   CC:   Chief Complaint   Patient presents with   • Contraception     would like to try to get pregnant     Maria E Cisneros is a 25 y.o. female presenting with  for evaluation and management of:    Reports that her and her  are wanting to start attempting to conceive.  States that she stopped her oral birth control in October 2020 and they have been using condoms to prevent pregnancy.  States that she has started a prenatal vitamin.  Reports that she has stopped drinking alcohol.  Denies having an established OB/GYN at this time.    ROS:  Constitutional: Denies fever, night sweats chills, weight loss/gain and/or malaise/fatigue.   HENT: Denies hearing loss, nasal congestion, sore throat enlarged thyroid, or difficulty swallowing.   Eyes: Denies changes in vision, pain. Does wear corrective wear, glasses for reading.  Respiratory: Denies  cough.  SOB at rest or activity.    Cardiovascular: Denies tachycardia, chest pain, palpitations, or leg swelling. Heavy chest, sternum, dry cough.  Gastrointestinal: Denies N/V/D/C, abdomen pain, loss appetite, reflux, or hematochezia.   Genitourinary: Denies difficulty voiding, dysuria, nocturia, or hematuria.   Skin: Negative for rash or worrisome moles.   Neurological: Negative for dizziness and/or focal weakness.  Headaches. Endo/Heme/Allergies: Denies bruise/bleed easily, allergies.   Psychiatric/Behavioral: Denies nervous/anxious and/or difficulty sleeping.  History of Depression and history of eating disorder.    Allergies   Allergen Reactions   • Codeine Vomiting   • Vicodin [Apap-Fd&C Yellow #10 Al Leonard-Hydrocodone]      Current medicines (including changes  "today)  Current Outpatient Medications   Medication Sig Dispense Refill   • Prenatal Vit-Fe Fum-FA-Omega (PRENATAL FORMULA PO) Take  by mouth.     • FLUoxetine HCl 60 MG Tab Take  by mouth.     • TRI-LO-ESTARYLLA 0.18/0.215/0.25 MG-25 MCG Tab Take 1 Tab by mouth every day.       No current facility-administered medications for this visit.     Patient Active Problem List    Diagnosis Date Noted   • Sensorineural hearing loss 02/18/2020   • Extrinsic asthma 02/18/2020   • ASCUS with positive high risk HPV cervical 11/25/2015   • Encounter for insertion of mirena IUD 10/29/2015   • Vasodepressor syncope 08/06/2013   • Family hx-breast malignancy 03/21/2013   • Eating disorder 02/16/2012   • Depression 02/16/2012   • Vaginitis and vulvovaginitis 02/17/2009   • Other acne 02/04/2009   • Dizziness and giddiness 01/23/2009     Family History   Problem Relation Age of Onset   • Cancer Mother         breaset cancer, 40's   • Diabetes Father         pre   • Hyperlipidemia Father    • Other Brother         NF, affects brain   • No Known Problems Maternal Grandmother    • No Known Problems Maternal Grandfather    • No Known Problems Paternal Grandmother    • No Known Problems Paternal Grandfather      She  has a past medical history of Allergy, Asthma, and Depression.  She  has a past surgical history that includes other orthopedic surgery (2010).     Objective:   Pulse 80   Ht 1.6 m (5' 3\")   Wt 57.6 kg (127 lb)   LMP 01/13/2021 (Exact Date)   BMI 22.50 kg/m²   Respiratory rate observed during visit: 14 bpm    Physical Exam:  Constitutional: Alert, no distress, well-groomed.  Skin: No rashes in visible areas.  Eye: Round. Conjunctiva clear, lids normal. No icterus.   ENMT: Lips pink without lesions, good dentition, moist mucous membranes. Phonation normal.  Neck: No masses, no thyromegaly. Moves freely without pain.  Respiratory: Unlabored respiratory effort, no cough or audible wheeze  Psych: Alert and oriented x3, normal " affect and mood.     Assessment and Plan:   1. Family planning education, guidance, and counseling  Discussed with patient continuing prenatal vitamin. Discussed the benefit of 400 mcg of Folic Acid growing fetus. Verbalized understanding and willingness. Discussed maintain hydration with water and maintain her current activity level and exercise regimen. Discussed avoiding any vigorous exercising, verbalized understanding. Discussed with patient not drinking alcohol and avoid cigarette smoke, verbalized understanding. Encouraged patient to wash hands regularly and avoid sick contacts while supporting immune system.  Discussed appropriate social distancing and isolation with patient to decrease risk of COVID-19, verbalized understanding. Discussed before starting any medication reaching out to PCP and/or OB/GYN to verify if it is appropriate. Discussed with patient exploring options for OB/GYN and to establish care new OB/GYN. Discussed with reaching out to PCP if she has any further questions before being able to establish care with OB, verbalized understanding.     Follow-up: Return for As needed.

## 2021-05-03 ENCOUNTER — TELEPHONE (OUTPATIENT)
Dept: MEDICAL GROUP | Facility: IMAGING CENTER | Age: 26
End: 2021-05-03

## 2021-05-03 NOTE — TELEPHONE ENCOUNTER
Patient called left message for PCP. She found out last night she is pregnant so she was wondering what her next steps are and if she could get a referral for OBGYN.

## 2021-05-06 ENCOUNTER — OFFICE VISIT (OUTPATIENT)
Dept: MEDICAL GROUP | Facility: IMAGING CENTER | Age: 26
End: 2021-05-06
Payer: COMMERCIAL

## 2021-05-06 VITALS
HEART RATE: 100 BPM | SYSTOLIC BLOOD PRESSURE: 124 MMHG | HEIGHT: 63 IN | TEMPERATURE: 97.9 F | WEIGHT: 133 LBS | OXYGEN SATURATION: 97 % | RESPIRATION RATE: 14 BRPM | BODY MASS INDEX: 23.57 KG/M2 | DIASTOLIC BLOOD PRESSURE: 56 MMHG

## 2021-05-06 DIAGNOSIS — Z34.91 PRENATAL CARE IN FIRST TRIMESTER: ICD-10-CM

## 2021-05-06 DIAGNOSIS — Z3A.01 LESS THAN 8 WEEKS GESTATION OF PREGNANCY: ICD-10-CM

## 2021-05-06 LAB
APPEARANCE UR: NORMAL
BILIRUB UR STRIP-MCNC: NEGATIVE MG/DL
COLOR UR AUTO: NORMAL
GLUCOSE UR STRIP.AUTO-MCNC: NEGATIVE MG/DL
INT CON NEG: NORMAL
INT CON POS: NORMAL
KETONES UR STRIP.AUTO-MCNC: NEGATIVE MG/DL
LEUKOCYTE ESTERASE UR QL STRIP.AUTO: NEGATIVE
NITRITE UR QL STRIP.AUTO: NEGATIVE
PH UR STRIP.AUTO: 7 [PH] (ref 5–8)
POC URINE PREGNANCY TEST: POSITIVE
PROT UR QL STRIP: NEGATIVE MG/DL
RBC UR QL AUTO: NEGATIVE
SP GR UR STRIP.AUTO: 1.02
UROBILINOGEN UR STRIP-MCNC: 0.2 MG/DL

## 2021-05-06 PROCEDURE — 99213 OFFICE O/P EST LOW 20 MIN: CPT | Performed by: NURSE PRACTITIONER

## 2021-05-06 PROCEDURE — 81002 URINALYSIS NONAUTO W/O SCOPE: CPT | Performed by: NURSE PRACTITIONER

## 2021-05-06 PROCEDURE — 81025 URINE PREGNANCY TEST: CPT | Performed by: NURSE PRACTITIONER

## 2021-05-06 ASSESSMENT — PAIN SCALES - GENERAL: PAINLEVEL: NO PAIN

## 2021-05-06 ASSESSMENT — FIBROSIS 4 INDEX: FIB4 SCORE: 0.56

## 2021-05-06 NOTE — PROGRESS NOTES
Subjective:   CC: Referral Needed (AGUILA) and Requesting Labs    HPI:   Maria E presents today with her  to discuss:    Less than 8 weeks gestation of pregnancy  Reports that her last menstrual cycle was April 7, 2021. States that she had a normal flow and cycle at that time. Denies any cramping and/or lower abdomen pain at this time. States that her and her  have been attempting to become pregnant since the beginning of this year. States that she is 2 days late for her scheduled menstrual cycle. States that she has taken multiple pregnancy test at home to verify that she is pregnant. States that she does not have an OB/GYN at this time. States that this morning she started to feel nauseous. Denies vomiting. States that she has not had this symptom until this morning. States that she has been experiencing constipation this last week. Denies any blood with bowel movement. States that her and her  are excited about this news of her pregnancy. Denies any history of STDs. Reports that she is taking a prenatal vitamin daily and tolerating well. States that she continues to take fluoxetine 60 mg daily. Denies any depressive symptoms at this time.    Past Medical History:   Diagnosis Date   • Allergy     nasal congestion, chronic   • Asthma     exercise induced asthma   • Depression      Social History     Tobacco Use   • Smoking status: Never Smoker   • Smokeless tobacco: Never Used   Substance Use Topics   • Alcohol use: Not Currently     Comment: very rare. Has not had a drink in the last 6 months.    • Drug use: No     Current Outpatient Medications Ordered in Epic   Medication Sig Dispense Refill   • Prenatal Vit-Fe Fum-FA-Omega (PRENATAL FORMULA PO) Take  by mouth.     • FLUoxetine HCl 60 MG Tab Take  by mouth.       No current Epic-ordered facility-administered medications on file.     Allergies:  Codeine and Vicodin [apap-fd&c yellow #10 [place]-hydrocodone]    Health Maintenance:  "Completed.    ROS:  Constitutional: Denies fever, night sweats, chills, weight loss/gain and/or malaise/fatigue.   HENT: Denies hearing loss, nasal congestion, sore throat, enlarged thyroid, or difficulty swallowing.   Eyes: Denies changes in vision, pain. Does wear corrective wear, glasses for reading.  Respiratory: Denies cough. SOB at rest or activity.    Cardiovascular: Denies tachycardia, chest pain, palpitations, and/or leg swelling.   Gastrointestinal: Denies V/D, abdomen pain, loss appetite, reflux, or hematochezia. Nausea and constipation, see HPI.  Genitourinary: Denies difficulty voiding, dysuria, nocturia, or hematuria. Missed menstrual cycle, see HPI.  Skin: Negative for rash or worrisome moles.   Neurological: Negative for dizziness, focal weakness, and/or headaches.   Endo/Heme/Allergies: Denies bruise/bleed easily, allergies.   Psychiatric/Behavioral: Denies nervous/anxious and/or difficulty sleeping.  History of Depression and history of eating disorder.    Objective:   Exam:  /56   Pulse 100   Temp 36.6 °C (97.9 °F)   Resp 14   Ht 1.6 m (5' 3\")   Wt 60.3 kg (133 lb)   LMP 04/07/2021 (Approximate)   SpO2 97%   Breastfeeding No   BMI 23.56 kg/m²  Body mass index is 23.56 kg/m².  General: Normal appearing. No distress.  HEENT: Normocephalic. Eyes conjunctiva clear lids without ptosis, PERRLA, ears normal shape and contour. Oral, ears, and nasal examine deferred due to current COVID-19 outbreak, no acute concerns. Patient wore a mask during visit.  Neck: Trachea midline, no masses, no thyromegaly.  Pulmonary: Clear to ausculation.  Normal effort. No rales, ronchi, or wheezing.  Cardiovascular: Regular rate and rhythm without murmur. Pedal and radial pulses are intact and equal bilaterally.  Abdomen: Soft, nontender, nondistended. Normal bowel sounds.   Neurologic: Grossly non-focal.  Skin: Warm and dry. No obvious lesions.  Musculoskeletal: Normal gait. No extremity cyanosis, clubbing, " or edema.   Psych: Normal mood and affect. Alert and oriented x3. Judgment and insight is normal.    Labs: POCT pregnancy test: Positive    Lab Results   Component Value Date/Time    POCCOLOR dark yellow 05/06/2021 08:30 AM    POCAPPEAR cloudy 05/06/2021 08:30 AM    POCLEUKEST negative 05/06/2021 08:30 AM    POCNITRITE negative 05/06/2021 08:30 AM    POCUROBILIGE 0.2 05/06/2021 08:30 AM    POCPROTEIN negative 05/06/2021 08:30 AM    POCURPH 7.0 05/06/2021 08:30 AM    POCBLOOD negative 05/06/2021 08:30 AM    POCSPGRV 1.025 05/06/2021 08:30 AM    POCKETONES negative 05/06/2021 08:30 AM    POCBILIRUBIN negative 05/06/2021 08:30 AM    POCGLUCUA negative 05/06/2021 08:30 AM      Assessment & Plan:   1. Less than 8 weeks gestation of pregnancy  2. Prenatal care in first trimester  This is a stable condition. Reviewed POC pregnancy test and UA, verbalized understanding. Discussed with patient continuing prenatal vitamin. Discussed the benefit of 400 mcg of Folic Acid growing fetus. Verbalized understanding. Discussed maintain hydration with water and maintain her current activity level and exercise regimen. Discussed avoiding any vigorous exercising, verbalized understanding. Discussed with patient not drinking alcohol and avoid cigarette smoke, verbalized understanding. Encouraged patient to wash hands regularly and avoid sick contacts while supporting immune system.  Discussed appropriate social distancing and isolation with patient to decrease risk of COVID-19, verbalized understanding. Discussed before starting any medication reaching out to PCP and/or OB/GYN to verify if it is appropriate. Discussed if she experiences morning sick to attempt to snack often to avoid symptoms. Discussed if she experiences constipation starting OTC colace 100-200 mg, verbalized understanding. Discussed referral to OB/GYN for further evaluation and management, verbalized understanding and willingness to participation in referral.  Instructed to notify PCP which OB/GYN she would like to establish with, will place referral at that time. Discussed with reaching out to PCP if she has any further questions before being able to establish care with OB, verbalized understanding. Instructed to continue taking fluoxetine 60 mg daily. Discussed with patient risk of postpartum depression due to history of depression, verbalized understanding. Instructed to notify PCP and/or seek emergency services if she develops any suicidal/homicidal thoughts, verbalized understanding and willingness.    -     POCT Urinalysis  -     POCT Pregnancy    Return for As needed.    Please note that this dictation was created using voice recognition software. I have made every reasonable attempt to correct obvious errors, but I expect that there are errors of grammar and possibly content that I did not discover before finalizing the note.

## 2021-05-07 PROBLEM — Z3A.01 LESS THAN 8 WEEKS GESTATION OF PREGNANCY: Status: ACTIVE | Noted: 2021-05-07

## 2021-05-07 NOTE — ASSESSMENT & PLAN NOTE
Reports that her last menstrual cycle was April 7, 2021. States that she had a normal flow and cycle at that time. Denies any cramping and/or lower abdomen pain at this time. States that her and her  have been attempting to become pregnant since the beginning of this year. States that she is 2 days late for her scheduled menstrual cycle. States that she has taken multiple pregnancy test at home to verify that she is pregnant. States that she does not have an OB/GYN at this time. States that this morning she started to feel nauseous. Denies vomiting. States that she has not had this symptom until this morning. States that she has been experiencing constipation this last week. Denies any blood with bowel movement. States that her and her  are excited about this news of her pregnancy. Denies any history of STDs. Reports that she is taking a prenatal vitamin daily and tolerating well. States that she continues to take fluoxetine 60 mg daily. Denies any depressive symptoms at this time.

## 2021-06-11 LAB
ABO GROUP BLD: NORMAL
BLD GP AB SCN SERPL QL: NORMAL
HBV SURFACE AG SERPL QL IA: NON REACTIVE
HIV 1+2 AB+HIV1 P24 AG SERPL QL IA: NON REACTIVE
RH BLD: NEGATIVE
RUBV IGG SERPL IA-ACNC: NORMAL
TREPONEMA PALLIDUM IGG+IGM AB [PRESENCE] IN SERUM OR PLASMA BY IMMUNOASSAY: NON REACTIVE

## 2021-07-07 ENCOUNTER — HOSPITAL ENCOUNTER (EMERGENCY)
Facility: MEDICAL CENTER | Age: 26
End: 2021-07-07
Attending: EMERGENCY MEDICINE
Payer: COMMERCIAL

## 2021-07-07 VITALS
WEIGHT: 128.53 LBS | SYSTOLIC BLOOD PRESSURE: 91 MMHG | TEMPERATURE: 98.1 F | HEIGHT: 63 IN | OXYGEN SATURATION: 98 % | HEART RATE: 71 BPM | RESPIRATION RATE: 14 BRPM | BODY MASS INDEX: 22.77 KG/M2 | DIASTOLIC BLOOD PRESSURE: 49 MMHG

## 2021-07-07 DIAGNOSIS — O21.0 MORNING SICKNESS: ICD-10-CM

## 2021-07-07 DIAGNOSIS — R55 VASOVAGAL SYNCOPE: ICD-10-CM

## 2021-07-07 DIAGNOSIS — K59.00 CONSTIPATION, UNSPECIFIED CONSTIPATION TYPE: ICD-10-CM

## 2021-07-07 DIAGNOSIS — E86.0 DEHYDRATION: ICD-10-CM

## 2021-07-07 DIAGNOSIS — R55 SYNCOPE, UNSPECIFIED SYNCOPE TYPE: ICD-10-CM

## 2021-07-07 DIAGNOSIS — Z3A.13 13 WEEKS GESTATION OF PREGNANCY: ICD-10-CM

## 2021-07-07 LAB
AMORPH CRY #/AREA URNS HPF: ABNORMAL /HPF
ANION GAP SERPL CALC-SCNC: 11 MMOL/L (ref 7–16)
APPEARANCE UR: ABNORMAL
BACTERIA #/AREA URNS HPF: ABNORMAL /HPF
BASOPHILS # BLD AUTO: 0.3 % (ref 0–1.8)
BASOPHILS # BLD: 0.04 K/UL (ref 0–0.12)
BILIRUB UR QL STRIP.AUTO: NEGATIVE
BUN SERPL-MCNC: 6 MG/DL (ref 8–22)
CALCIUM SERPL-MCNC: 9 MG/DL (ref 8.4–10.2)
CHLORIDE SERPL-SCNC: 103 MMOL/L (ref 96–112)
CO2 SERPL-SCNC: 23 MMOL/L (ref 20–33)
COLOR UR: YELLOW
CREAT SERPL-MCNC: 0.47 MG/DL (ref 0.5–1.4)
EKG IMPRESSION: NORMAL
EOSINOPHIL # BLD AUTO: 0.13 K/UL (ref 0–0.51)
EOSINOPHIL NFR BLD: 1.1 % (ref 0–6.9)
EPI CELLS #/AREA URNS HPF: ABNORMAL /HPF
ERYTHROCYTE [DISTWIDTH] IN BLOOD BY AUTOMATED COUNT: 40.2 FL (ref 35.9–50)
GLUCOSE SERPL-MCNC: 83 MG/DL (ref 65–99)
GLUCOSE UR STRIP.AUTO-MCNC: NEGATIVE MG/DL
HCT VFR BLD AUTO: 40.7 % (ref 37–47)
HGB BLD-MCNC: 14.1 G/DL (ref 12–16)
IMM GRANULOCYTES # BLD AUTO: 0.11 K/UL (ref 0–0.11)
IMM GRANULOCYTES NFR BLD AUTO: 0.9 % (ref 0–0.9)
KETONES UR STRIP.AUTO-MCNC: 15 MG/DL
LEUKOCYTE ESTERASE UR QL STRIP.AUTO: ABNORMAL
LYMPHOCYTES # BLD AUTO: 1.82 K/UL (ref 1–4.8)
LYMPHOCYTES NFR BLD: 15.2 % (ref 22–41)
MCH RBC QN AUTO: 31.2 PG (ref 27–33)
MCHC RBC AUTO-ENTMCNC: 34.6 G/DL (ref 33.6–35)
MCV RBC AUTO: 90 FL (ref 81.4–97.8)
MICRO URNS: ABNORMAL
MONOCYTES # BLD AUTO: 0.42 K/UL (ref 0–0.85)
MONOCYTES NFR BLD AUTO: 3.5 % (ref 0–13.4)
NEUTROPHILS # BLD AUTO: 9.47 K/UL (ref 2–7.15)
NEUTROPHILS NFR BLD: 79 % (ref 44–72)
NITRITE UR QL STRIP.AUTO: NEGATIVE
NRBC # BLD AUTO: 0 K/UL
NRBC BLD-RTO: 0 /100 WBC
PH UR STRIP.AUTO: 8.5 [PH] (ref 5–8)
PLATELET # BLD AUTO: 222 K/UL (ref 164–446)
PMV BLD AUTO: 10.4 FL (ref 9–12.9)
POTASSIUM SERPL-SCNC: 3.6 MMOL/L (ref 3.6–5.5)
PROT UR QL STRIP: NEGATIVE MG/DL
RBC # BLD AUTO: 4.52 M/UL (ref 4.2–5.4)
RBC # URNS HPF: ABNORMAL /HPF
RBC UR QL AUTO: NEGATIVE
SODIUM SERPL-SCNC: 137 MMOL/L (ref 135–145)
SP GR UR STRIP.AUTO: 1.02
WBC # BLD AUTO: 12 K/UL (ref 4.8–10.8)
WBC #/AREA URNS HPF: ABNORMAL /HPF

## 2021-07-07 PROCEDURE — 93005 ELECTROCARDIOGRAM TRACING: CPT

## 2021-07-07 PROCEDURE — 96374 THER/PROPH/DIAG INJ IV PUSH: CPT

## 2021-07-07 PROCEDURE — 85025 COMPLETE CBC W/AUTO DIFF WBC: CPT

## 2021-07-07 PROCEDURE — 80048 BASIC METABOLIC PNL TOTAL CA: CPT

## 2021-07-07 PROCEDURE — 81001 URINALYSIS AUTO W/SCOPE: CPT

## 2021-07-07 PROCEDURE — 99285 EMERGENCY DEPT VISIT HI MDM: CPT

## 2021-07-07 PROCEDURE — 700105 HCHG RX REV CODE 258: Performed by: EMERGENCY MEDICINE

## 2021-07-07 PROCEDURE — 700111 HCHG RX REV CODE 636 W/ 250 OVERRIDE (IP): Performed by: EMERGENCY MEDICINE

## 2021-07-07 PROCEDURE — 36415 COLL VENOUS BLD VENIPUNCTURE: CPT

## 2021-07-07 PROCEDURE — 93005 ELECTROCARDIOGRAM TRACING: CPT | Performed by: EMERGENCY MEDICINE

## 2021-07-07 RX ORDER — SODIUM CHLORIDE 9 MG/ML
1000 INJECTION, SOLUTION INTRAVENOUS ONCE
Status: COMPLETED | OUTPATIENT
Start: 2021-07-07 | End: 2021-07-07

## 2021-07-07 RX ORDER — ONDANSETRON 2 MG/ML
4 INJECTION INTRAMUSCULAR; INTRAVENOUS ONCE
Status: COMPLETED | OUTPATIENT
Start: 2021-07-07 | End: 2021-07-07

## 2021-07-07 RX ORDER — FLUOXETINE HYDROCHLORIDE 20 MG/1
60 CAPSULE ORAL DAILY
COMMUNITY

## 2021-07-07 RX ADMIN — SODIUM CHLORIDE 1000 ML: 9 INJECTION, SOLUTION INTRAVENOUS at 12:51

## 2021-07-07 RX ADMIN — ONDANSETRON 4 MG: 2 INJECTION INTRAMUSCULAR; INTRAVENOUS at 11:27

## 2021-07-07 RX ADMIN — SODIUM CHLORIDE 1000 ML: 9 INJECTION, SOLUTION INTRAVENOUS at 11:18

## 2021-07-07 ASSESSMENT — FIBROSIS 4 INDEX: FIB4 SCORE: 0.56

## 2021-07-07 NOTE — ED NOTES
Pt lying quietly on gurney.  IV infusing w-o; IV site patent.  Reports decreased nausea.  BP 86/49; will notify ERP

## 2021-07-07 NOTE — ED NOTES
Med rec updated and complete  Allergies reviewed  Interviewed pt with  at bedside with permission from pt.  Pt reports no antibiotics in the last 30 days.      No current facility-administered medications on file prior to encounter.     Current Outpatient Medications on File Prior to Encounter   Medication Sig Dispense Refill   • Prenatal MV & Min w/FA-DHA (PRENATAL ADULT GUMMY/DHA/FA PO) Take 2 Tablets by mouth every day.     • FLUoxetine (PROZAC) 20 MG Cap Take 60 mg by mouth every day.

## 2021-07-07 NOTE — ED TRIAGE NOTES
Chief Complaint   Patient presents with   • Syncope   Passed out in the bathroom at work this am, 13 weeks pregnant. She got up form her desk to vomit when she passed out, she denies injury on fall. Co worker pounded on the door and got her out. Taken to the ED for a work up.

## 2021-07-07 NOTE — ED NOTES
Pt ambulatory to & from [facility] w/out incident; gait steady; denied lightheadedness/dizziness w/ position change.

## 2021-07-07 NOTE — ED PROVIDER NOTES
ED Provider Note    Scribed for Jeremiah Galvan M.D. by Tara Valdivia. 2021  10:17 AM    Primary care provider: [facility] Gomez  Means of arrival: Walk in  History obtained from: Patient  History limited by: None    CHIEF COMPLAINT  Chief Complaint   Patient presents with    Syncope       HPI  Maria E Cisneros is a 25 y.o. female, currently 14 weeks 2 days pregnant, obstetrics history of , who presents to the Emergency Department accompanied by her , for evaluation status post syncopal episode with an onset of earlier today. The patient was at work when felt slightly constipated, went to the bathroom, and past out there. She states her coworkers heard her fall and that she was noted to be down for 5 seconds. When the patient woke up, she adds she promptly vomited. She reports associated fatigue, and lightheadedness. She denies any associated vaginal bleeding, vaginal discharge, or abdominal pain. No alleviating or exacerbating factors were identified. She is [facility] followed [facility] and has had 2 normal ultrasounds.     REVIEW OF SYSTEMS  Pertinent positives include syncope, fatigue, and lightheadedness.   Pertinent negatives include no vaginal bleeding, vaginal discharge, or abdominal pain.    All other systems reviewed and negative. See HPI for further details.     PAST MEDICAL HISTORY   has a past medical history of Allergy, Asthma, and Depression.    SURGICAL HISTORY   has a past surgical history that includes other orthopedic surgery ().    SOCIAL HISTORY  Social History     Tobacco Use    Smoking status: Never Smoker    Smokeless tobacco: Never Used   Vaping Use    Vaping Use: Never used   Substance Use Topics    Alcohol use: Not Currently     Comment: very rare. Has not had a drink in the last 6 months.     Drug use: No      Social History     Substance and Sexual Activity   Drug Use No       FAMILY HISTORY  Family History   Problem Relation Age of Onset  "   Cancer Mother         breaset cancer, 40's    Diabetes Father         pre    Hyperlipidemia Father     Other Brother         NF, affects brain    No Known Problems Maternal Grandmother     No Known Problems Maternal Grandfather     No Known Problems Paternal Grandmother     No Known Problems Paternal Grandfather        CURRENT MEDICATIONS  Home Medications       Reviewed by Rolan Barajas (Pharmacy Tech) on 07/07/21 at 1008  Med List Status: Complete     Medication Last Dose Status   FLUoxetine (PROZAC) 20 MG Cap >2 days Active   Prenatal MV & Min w/FA-DHA (PRENATAL ADULT GUMMY/DHA/FA PO) 7/7/2021 Active                    ALLERGIES  Allergies   Allergen Reactions    Codeine Vomiting    Vicodin [Apap-Fd&C Yellow #10 [place]-Hydrocodone] Vomiting       PHYSICAL EXAM  VITAL SIGNS: /65   Pulse 71   Temp 36.4 °C (97.5 °F) (Temporal)   Resp 16   Ht 1.6 m (5' 3\")   Wt 58.3 kg (128 lb 8.5 oz)   LMP 04/07/2021 (Approximate)   SpO2 99%   BMI 22.77 kg/m²     Nursing note and vitals reviewed.  Constitutional: Well-developed and well-nourished. No distress.   HENT: Head is normocephalic and atraumatic. Oropharynx is clear and moist without exudate or erythema.   Eyes: Pupils are equal, round, and reactive to light. Conjunctiva are normal.   Cardiovascular: Normal rate and regular rhythm. No murmur heard. Normal radial pulses.  Pulmonary/Chest: Breath sounds normal. No wheezes or rales.   Abdominal: Soft, non-tender and non-distended. Normal active bowel sounds. No guarding or peritoneal signs. No palpable abdominal aortic aneurysm. No masses. No tenderness at McBurney's point. Uterine fundus not palpable.  Musculoskeletal: Extremities exhibit normal range of motion without edema or tenderness.   Neurological: Awake, alert and oriented to person, place, and time. No focal deficits noted.  Skin: Skin is warm and dry. No rash.  Psychiatric: Normal mood and affect. Appropriate for clinical " situation    DIAGNOSTIC STUDIES / PROCEDURES    EKG Interpretation  Interpreted by me as below.    LABS  Results for orders placed or performed during the hospital encounter of 21   CBC WITH DIFFERENTIAL   Result Value Ref Range    WBC 12.0 (H) 4.8 - 10.8 K/uL    RBC 4.52 4.20 - 5.40 M/uL    Hemoglobin 14.1 12.0 - 16.0 g/dL    Hematocrit 40.7 37.0 - 47.0 %    MCV 90.0 81.4 - 97.8 fL    MCH 31.2 27.0 - 33.0 pg    MCHC 34.6 33.6 - 35.0 g/dL    RDW 40.2 35.9 - 50.0 fL    Platelet Count 222 164 - 446 K/uL    MPV 10.4 9.0 - 12.9 fL    Neutrophils-Polys 79.00 (H) 44.00 - 72.00 %    Lymphocytes 15.20 (L) 22.00 - 41.00 %    Monocytes 3.50 0.00 - 13.40 %    Eosinophils 1.10 0.00 - 6.90 %    Basophils 0.30 0.00 - 1.80 %    Immature Granulocytes 0.90 0.00 - 0.90 %    Nucleated RBC 0.00 /100 WBC    Neutrophils (Absolute) 9.47 (H) 2.00 - 7.15 K/uL    Lymphs (Absolute) 1.82 1.00 - 4.80 K/uL    Monos (Absolute) 0.42 0.00 - 0.85 K/uL    Eos (Absolute) 0.13 0.00 - 0.51 K/uL    Baso (Absolute) 0.04 0.00 - 0.12 K/uL    Immature Granulocytes (abs) 0.11 0.00 - 0.11 K/uL    NRBC (Absolute) 0.00 K/uL   EKG   Result Value Ref Range    Report       [facility] Emergency Dept.    Test Date:  2021  Pt Name:    TIP LEWISLILLIAN               Department: [facility]  MRN:        [id number]                      Room:       -ROOM 2  Gender:     Female                       Technician: [id number]  :        [id number]                   Requested By:ER TRIAGE PROTOCOL  Order #:    [id number]                    Reading MD:    Measurements  Intervals                                Axis  Rate:       71                           P:          26  IN:         159                          QRS:        -15  QRSD:       102                          T:          17  QT:         403  QTc:        438    Interpretive Statements  Sinus rhythm  Borderline left axis deviation  Low voltage, precordial leads  RSR' in V1 or V2, probably normal  variant  Compared to ECG 10/03/2020 20:10:13  Sinus tachycardia no longer present  T-wave abnormality no longer present         All labs reviewed by me.    COURSE & MEDICAL DECISION MAKING  Nursing notes, VS, PMSFHx reviewed in chart.     10:17 AM - Patient seen and examined at bedside. Patient will be treated with Zofran 4 mg. Intravenous fluids were administered for hypotension Ordered EKG, UA culture, BMP, CBC with diff to evaluate her symptoms. The differential diagnoses include but are not limited to: vasovagal syncope, electrolyte abnormality, dehydration.      12:30 PM - Patient was reevaluated at bedside. Patient is resting in bed comfortably. She is still hypotensive Discussed lab and radiology results with the patient and informed them that they were reassuring. We will continue to monitor the patient's blood pressure.      On repeat evaluation, patient is still hypotensive after fluids.    1:50 PM on recheck the patient says that she is feeling better.  Blood pressures improved.  Given 2 L of fluid in the emergency department.  She is tolerating orals.  She has benign abdominal exam.  No history of vaginal bleeding or leakage of fluid.  She has a confirmed IUP by ultrasound.  Feel her symptoms are likely due today from a vasovagal episode.  Patient says that she was constipated in the bathroom.  She has had weeks of nausea and vomiting with morning sickness.  Fairly combination likely resulted in the patient's episode of syncope.  Overall emergency department work-up is reassuring.  Discharged home in stable condition.    HYDRATION: Based on the patient's presentation of Hypotension the patient was given IV fluids. IV Hydration was used because oral hydration was not adequate alone. Upon recheck following hydration, the patient was still hypotensive.      The patient will return for new or worsening symptoms and is stable at the time of discharge.    The patient is referred to a primary physician for  blood pressure management, diabetic screening, and for all other preventative health concerns.    DISPOSITION:  Patient will be discharged home in stable condition.    FOLLOW UP:  [facility], Emergency Dept  [address]  [place] [address] [phone]    If symptoms worsen    [facility]  [address]  [place] [id number] [phone]  Schedule an appointment as soon as possible for a visit         OUTPATIENT MEDICATIONS:  New Prescriptions    No medications on file         FINAL IMPRESSION  1. Dehydration    2. Syncope, unspecified syncope type    3. Vasovagal syncope    4. Morning sickness    5. Constipation, unspecified constipation type    6. 13 weeks gestation of pregnancy          ITara (Scribe), am scribing for, and in the presence of, Jeremiah Galvan M.D..    Electronically signed by: Tara Valdivia (Scribe), 7/7/2021    IJeremiah M.D. personally performed the services described in this documentation, as scribed by Tara Valdivia in my presence, and it is both accurate and complete.    C    The note accurately reflects work and decisions made by me.  Jeremiah Galvan M.D.  7/7/2021  1:51 PM

## 2021-07-07 NOTE — ED NOTES
"Pt ambulatory to & from [facility] w/out incident; gait steady.  Voided specimen provided: cloudy yellow.   Pt states she passed out in bathroom at work.  Pt currently 13 weeks PG.  LMP: 21.  Reports decreased appetite r/t nausea (morning sickness); \"only ate fruit this morning\".  OB appt on 21.  Hx ovarian cyst.   Ab 0.  Spouse in room.   "

## 2021-09-24 LAB — TREPONEMA PALLIDUM IGG+IGM AB [PRESENCE] IN SERUM OR PLASMA BY IMMUNOASSAY: NON REACTIVE

## 2021-10-12 LAB — GLUCOSE 1H P 50 G GLC PO SERPL-MCNC: 123 MG/DL

## 2021-12-16 LAB — GP B STREP DNA SPEC QL NAA+PROBE: NEGATIVE

## 2022-01-01 ENCOUNTER — HOSPITAL ENCOUNTER (INPATIENT)
Facility: MEDICAL CENTER | Age: 27
LOS: 2 days | End: 2022-01-03
Attending: OBSTETRICS & GYNECOLOGY | Admitting: OBSTETRICS & GYNECOLOGY
Payer: COMMERCIAL

## 2022-01-01 PROCEDURE — 85025 COMPLETE CBC W/AUTO DIFF WBC: CPT

## 2022-01-01 PROCEDURE — 770002 HCHG ROOM/CARE - OB PRIVATE (112)

## 2022-01-01 PROCEDURE — 87426 SARSCOV CORONAVIRUS AG IA: CPT

## 2022-01-01 PROCEDURE — 36415 COLL VENOUS BLD VENIPUNCTURE: CPT

## 2022-01-01 PROCEDURE — 86901 BLOOD TYPING SEROLOGIC RH(D): CPT

## 2022-01-01 RX ORDER — SODIUM CHLORIDE, SODIUM LACTATE, POTASSIUM CHLORIDE, CALCIUM CHLORIDE 600; 310; 30; 20 MG/100ML; MG/100ML; MG/100ML; MG/100ML
INJECTION, SOLUTION INTRAVENOUS CONTINUOUS
Status: DISCONTINUED | OUTPATIENT
Start: 2022-01-01 | End: 2022-01-03 | Stop reason: HOSPADM

## 2022-01-01 ASSESSMENT — FIBROSIS 4 INDEX: FIB4 SCORE: 0.6

## 2022-01-02 ENCOUNTER — ANESTHESIA EVENT (OUTPATIENT)
Dept: ANESTHESIOLOGY | Facility: MEDICAL CENTER | Age: 27
End: 2022-01-02
Payer: COMMERCIAL

## 2022-01-02 ENCOUNTER — ANESTHESIA (OUTPATIENT)
Dept: ANESTHESIOLOGY | Facility: MEDICAL CENTER | Age: 27
End: 2022-01-02
Payer: COMMERCIAL

## 2022-01-02 LAB
BASOPHILS # BLD AUTO: 0.3 % (ref 0–1.8)
BASOPHILS # BLD: 0.04 K/UL (ref 0–0.12)
EOSINOPHIL # BLD AUTO: 0.31 K/UL (ref 0–0.51)
EOSINOPHIL NFR BLD: 2.6 % (ref 0–6.9)
ERYTHROCYTE [DISTWIDTH] IN BLOOD BY AUTOMATED COUNT: 40.3 FL (ref 35.9–50)
ERYTHROCYTE [DISTWIDTH] IN BLOOD BY AUTOMATED COUNT: 41.1 FL (ref 35.9–50)
HCT VFR BLD AUTO: 35.6 % (ref 37–47)
HCT VFR BLD AUTO: 36.4 % (ref 37–47)
HGB BLD-MCNC: 11.9 G/DL (ref 12–16)
HGB BLD-MCNC: 12.2 G/DL (ref 12–16)
HOLDING TUBE BB 8507: NORMAL
IMM GRANULOCYTES # BLD AUTO: 0.05 K/UL (ref 0–0.11)
IMM GRANULOCYTES NFR BLD AUTO: 0.4 % (ref 0–0.9)
IMMUNE ROSETTING TEST 8505FMH: NORMAL
LYMPHOCYTES # BLD AUTO: 2.46 K/UL (ref 1–4.8)
LYMPHOCYTES NFR BLD: 20.7 % (ref 22–41)
MCH RBC QN AUTO: 29.2 PG (ref 27–33)
MCH RBC QN AUTO: 29.5 PG (ref 27–33)
MCHC RBC AUTO-ENTMCNC: 33.4 G/DL (ref 33.6–35)
MCHC RBC AUTO-ENTMCNC: 33.5 G/DL (ref 33.6–35)
MCV RBC AUTO: 87.5 FL (ref 81.4–97.8)
MCV RBC AUTO: 87.9 FL (ref 81.4–97.8)
MONOCYTES # BLD AUTO: 0.76 K/UL (ref 0–0.85)
MONOCYTES NFR BLD AUTO: 6.4 % (ref 0–13.4)
NEUTROPHILS # BLD AUTO: 8.25 K/UL (ref 2–7.15)
NEUTROPHILS NFR BLD: 69.6 % (ref 44–72)
NRBC # BLD AUTO: 0 K/UL
NRBC BLD-RTO: 0 /100 WBC
NUMBER OF RH DOSES IND 8505RD: 1
PLATELET # BLD AUTO: 236 K/UL (ref 164–446)
PLATELET # BLD AUTO: 245 K/UL (ref 164–446)
PMV BLD AUTO: 11.2 FL (ref 9–12.9)
PMV BLD AUTO: 11.3 FL (ref 9–12.9)
RBC # BLD AUTO: 4.07 M/UL (ref 4.2–5.4)
RBC # BLD AUTO: 4.14 M/UL (ref 4.2–5.4)
RH BLD: NORMAL
SARS-COV+SARS-COV-2 AG RESP QL IA.RAPID: NOTDETECTED
SPECIMEN SOURCE: NORMAL
WBC # BLD AUTO: 11.9 K/UL (ref 4.8–10.8)
WBC # BLD AUTO: 16.9 K/UL (ref 4.8–10.8)

## 2022-01-02 PROCEDURE — 700111 HCHG RX REV CODE 636 W/ 250 OVERRIDE (IP): Performed by: OBSTETRICS & GYNECOLOGY

## 2022-01-02 PROCEDURE — 700101 HCHG RX REV CODE 250: Performed by: ANESTHESIOLOGY

## 2022-01-02 PROCEDURE — 36415 COLL VENOUS BLD VENIPUNCTURE: CPT

## 2022-01-02 PROCEDURE — 770002 HCHG ROOM/CARE - OB PRIVATE (112)

## 2022-01-02 PROCEDURE — A9270 NON-COVERED ITEM OR SERVICE: HCPCS | Performed by: OBSTETRICS & GYNECOLOGY

## 2022-01-02 PROCEDURE — 700105 HCHG RX REV CODE 258: Performed by: ANESTHESIOLOGY

## 2022-01-02 PROCEDURE — 303615 HCHG EPIDURAL/SPINAL ANESTHESIA FOR LABOR

## 2022-01-02 PROCEDURE — 59409 OBSTETRICAL CARE: CPT

## 2022-01-02 PROCEDURE — 304965 HCHG RECOVERY SERVICES

## 2022-01-02 PROCEDURE — 85027 COMPLETE CBC AUTOMATED: CPT

## 2022-01-02 PROCEDURE — 700111 HCHG RX REV CODE 636 W/ 250 OVERRIDE (IP)

## 2022-01-02 PROCEDURE — 99999 PR NO CHARGE: CPT | Performed by: OBSTETRICS & GYNECOLOGY

## 2022-01-02 PROCEDURE — 700105 HCHG RX REV CODE 258: Performed by: OBSTETRICS & GYNECOLOGY

## 2022-01-02 PROCEDURE — 85461 HEMOGLOBIN FETAL: CPT

## 2022-01-02 PROCEDURE — 0KQM0ZZ REPAIR PERINEUM MUSCLE, OPEN APPROACH: ICD-10-PCS | Performed by: OBSTETRICS & GYNECOLOGY

## 2022-01-02 PROCEDURE — 700102 HCHG RX REV CODE 250 W/ 637 OVERRIDE(OP): Performed by: OBSTETRICS & GYNECOLOGY

## 2022-01-02 PROCEDURE — 700111 HCHG RX REV CODE 636 W/ 250 OVERRIDE (IP): Performed by: ANESTHESIOLOGY

## 2022-01-02 RX ORDER — SODIUM CHLORIDE, SODIUM LACTATE, POTASSIUM CHLORIDE, CALCIUM CHLORIDE 600; 310; 30; 20 MG/100ML; MG/100ML; MG/100ML; MG/100ML
INJECTION, SOLUTION INTRAVENOUS PRN
Status: DISCONTINUED | OUTPATIENT
Start: 2022-01-02 | End: 2022-01-03 | Stop reason: HOSPADM

## 2022-01-02 RX ORDER — ONDANSETRON 2 MG/ML
4 INJECTION INTRAMUSCULAR; INTRAVENOUS EVERY 6 HOURS PRN
Status: DISCONTINUED | OUTPATIENT
Start: 2022-01-02 | End: 2022-01-03 | Stop reason: HOSPADM

## 2022-01-02 RX ORDER — TERBUTALINE SULFATE 1 MG/ML
INJECTION, SOLUTION SUBCUTANEOUS
Status: COMPLETED
Start: 2022-01-02 | End: 2022-01-02

## 2022-01-02 RX ORDER — SODIUM CHLORIDE, SODIUM LACTATE, POTASSIUM CHLORIDE, AND CALCIUM CHLORIDE .6; .31; .03; .02 G/100ML; G/100ML; G/100ML; G/100ML
1000 INJECTION, SOLUTION INTRAVENOUS
Status: COMPLETED | OUTPATIENT
Start: 2022-01-02 | End: 2022-01-02

## 2022-01-02 RX ORDER — ROPIVACAINE HYDROCHLORIDE 2 MG/ML
INJECTION, SOLUTION EPIDURAL; INFILTRATION; PERINEURAL CONTINUOUS
Status: DISCONTINUED | OUTPATIENT
Start: 2022-01-02 | End: 2022-01-03 | Stop reason: HOSPADM

## 2022-01-02 RX ORDER — DOCUSATE SODIUM 100 MG/1
100 CAPSULE, LIQUID FILLED ORAL 2 TIMES DAILY PRN
Status: DISCONTINUED | OUTPATIENT
Start: 2022-01-02 | End: 2022-01-03 | Stop reason: HOSPADM

## 2022-01-02 RX ORDER — CARBOPROST TROMETHAMINE 250 UG/ML
250 INJECTION, SOLUTION INTRAMUSCULAR
Status: DISCONTINUED | OUTPATIENT
Start: 2022-01-02 | End: 2022-01-03 | Stop reason: HOSPADM

## 2022-01-02 RX ORDER — ROPIVACAINE HYDROCHLORIDE 2 MG/ML
INJECTION, SOLUTION EPIDURAL; INFILTRATION; PERINEURAL
Status: COMPLETED
Start: 2022-01-02 | End: 2022-01-02

## 2022-01-02 RX ORDER — ONDANSETRON 4 MG/1
4 TABLET, ORALLY DISINTEGRATING ORAL EVERY 6 HOURS PRN
Status: DISCONTINUED | OUTPATIENT
Start: 2022-01-02 | End: 2022-01-03 | Stop reason: HOSPADM

## 2022-01-02 RX ORDER — LIDOCAINE HYDROCHLORIDE AND EPINEPHRINE 15; 5 MG/ML; UG/ML
INJECTION, SOLUTION EPIDURAL
Status: COMPLETED | OUTPATIENT
Start: 2022-01-02 | End: 2022-01-02

## 2022-01-02 RX ORDER — DOCUSATE SODIUM 100 MG/1
100 CAPSULE, LIQUID FILLED ORAL 2 TIMES DAILY PRN
COMMUNITY
End: 2022-05-06

## 2022-01-02 RX ORDER — SODIUM CHLORIDE, SODIUM LACTATE, POTASSIUM CHLORIDE, AND CALCIUM CHLORIDE .6; .31; .03; .02 G/100ML; G/100ML; G/100ML; G/100ML
250 INJECTION, SOLUTION INTRAVENOUS PRN
Status: DISCONTINUED | OUTPATIENT
Start: 2022-01-02 | End: 2022-01-02 | Stop reason: HOSPADM

## 2022-01-02 RX ORDER — MISOPROSTOL 200 UG/1
600 TABLET ORAL
Status: DISCONTINUED | OUTPATIENT
Start: 2022-01-02 | End: 2022-01-03 | Stop reason: HOSPADM

## 2022-01-02 RX ORDER — BUPIVACAINE HYDROCHLORIDE 2.5 MG/ML
INJECTION, SOLUTION EPIDURAL; INFILTRATION; INTRACAUDAL
Status: COMPLETED
Start: 2022-01-02 | End: 2022-01-02

## 2022-01-02 RX ORDER — BUPIVACAINE HYDROCHLORIDE 2.5 MG/ML
INJECTION, SOLUTION EPIDURAL; INFILTRATION; INTRACAUDAL
Status: COMPLETED | OUTPATIENT
Start: 2022-01-02 | End: 2022-01-02

## 2022-01-02 RX ORDER — METHYLERGONOVINE MALEATE 0.2 MG/ML
0.2 INJECTION INTRAVENOUS
Status: DISCONTINUED | OUTPATIENT
Start: 2022-01-02 | End: 2022-01-03 | Stop reason: HOSPADM

## 2022-01-02 RX ORDER — IBUPROFEN 600 MG/1
600 TABLET ORAL EVERY 6 HOURS PRN
Status: DISCONTINUED | OUTPATIENT
Start: 2022-01-02 | End: 2022-01-03 | Stop reason: HOSPADM

## 2022-01-02 RX ADMIN — SODIUM CHLORIDE, POTASSIUM CHLORIDE, SODIUM LACTATE AND CALCIUM CHLORIDE 1000 ML: 600; 310; 30; 20 INJECTION, SOLUTION INTRAVENOUS at 01:00

## 2022-01-02 RX ADMIN — ROPIVACAINE HYDROCHLORIDE: 2 INJECTION, SOLUTION EPIDURAL; INFILTRATION at 01:50

## 2022-01-02 RX ADMIN — LIDOCAINE HYDROCHLORIDE,EPINEPHRINE BITARTRATE 3 ML: 15; .005 INJECTION, SOLUTION EPIDURAL; INFILTRATION; INTRACAUDAL; PERINEURAL at 01:44

## 2022-01-02 RX ADMIN — ROPIVACAINE HYDROCHLORIDE: 2 INJECTION, SOLUTION EPIDURAL; INFILTRATION; PERINEURAL at 01:50

## 2022-01-02 RX ADMIN — FENTANYL CITRATE 100 MCG: 50 INJECTION, SOLUTION INTRAMUSCULAR; INTRAVENOUS at 01:44

## 2022-01-02 RX ADMIN — BUPIVACAINE HYDROCHLORIDE 8 ML: 2.5 INJECTION, SOLUTION EPIDURAL; INFILTRATION; INTRACAUDAL; PERINEURAL at 01:44

## 2022-01-02 RX ADMIN — OXYTOCIN 2 MILLI-UNITS/MIN: 10 INJECTION, SOLUTION INTRAMUSCULAR; INTRAVENOUS at 00:29

## 2022-01-02 RX ADMIN — Medication 2000 ML/HR: at 06:00

## 2022-01-02 RX ADMIN — Medication 125 ML/HR: at 07:30

## 2022-01-02 RX ADMIN — SODIUM CHLORIDE, POTASSIUM CHLORIDE, SODIUM LACTATE AND CALCIUM CHLORIDE: 600; 310; 30; 20 INJECTION, SOLUTION INTRAVENOUS at 00:29

## 2022-01-02 RX ADMIN — SODIUM CHLORIDE, POTASSIUM CHLORIDE, SODIUM LACTATE AND CALCIUM CHLORIDE: 600; 310; 30; 20 INJECTION, SOLUTION INTRAVENOUS at 01:57

## 2022-01-02 RX ADMIN — IBUPROFEN 600 MG: 600 TABLET ORAL at 20:23

## 2022-01-02 ASSESSMENT — PATIENT HEALTH QUESTIONNAIRE - PHQ9
SUM OF ALL RESPONSES TO PHQ9 QUESTIONS 1 AND 2: 0
2. FEELING DOWN, DEPRESSED, IRRITABLE, OR HOPELESS: NOT AT ALL
1. LITTLE INTEREST OR PLEASURE IN DOING THINGS: NOT AT ALL

## 2022-01-02 ASSESSMENT — LIFESTYLE VARIABLES
EVER_SMOKED: NEVER
ALCOHOL_USE: NO

## 2022-01-02 NOTE — PROGRESS NOTES
2255-Pt presents to L&D c/o SROM at 2230 clear fluid and irregular UC's since then. Denies VB and confirms +FM. TOCO and EFM applied. Gross pooling of clear fluid noted. VS taken. POC discussed  2300-SVE as charted in flowsheets  2304-Phoned Dr. Uribe, updated on pt arrival/complaint/status, admit orders received  2315-Report given to Nellie ANDRE RN. POC discussed

## 2022-01-02 NOTE — PROGRESS NOTES
0015 Report received from LINDA Ballard. Assuming care. Pt resting comfortably in bed, coping with contractions. No needs at this time.     0145 Pt desires epidural for pain management. Epidural placed by Dr. Miller. Pt tolerated well and states adequate pain relief.     0559  of a female infant. APGARS 4/7.    0700 Report given to LINDA Garcia.

## 2022-01-02 NOTE — CARE PLAN
Problem: Risk for Injury  Goal: Patient and fetus will be free of preventable injury/complications  Outcome: Met  Note:  of a female infant. APGARS 4/7.     Problem: Pain  Goal: Patient's pain will be alleviated or reduced to the patient’s comfort goal  Outcome: Met  Note: Pt desires epidural for pain management. Epidural placed by. Pt tolerated well and states adequate pain relief.

## 2022-01-02 NOTE — H&P
History and Physical      Maria E Cisneros is a 26 y.o. female  at 38w4d who presents for SROM 2230    Subjective:   negative  For CTXS.   negative Feels pain   positive for LOF  negative for vaginal bleeding.   positive for fetal movement    ROS: Pertinent items are noted in HPI.    Past Medical History:   Diagnosis Date   • Allergy     nasal congestion, chronic   • Asthma     exercise induced asthma   • Depression      Past Surgical History:   Procedure Laterality Date   • OTHER ORTHOPEDIC SURGERY      mensicus torn, repaired     Family History   Problem Relation Age of Onset   • Cancer Mother         breaset cancer, 40's   • Diabetes Father         pre   • Hyperlipidemia Father    • Other Brother         NF, affects brain   • No Known Problems Maternal Grandmother    • No Known Problems Maternal Grandfather    • No Known Problems Paternal Grandmother    • No Known Problems Paternal Grandfather      OB History    Para Term  AB Living   1 0 0 0 0     SAB IAB Ectopic Molar Multiple Live Births   0 0 0            # Outcome Date GA Lbr Massimo/2nd Weight Sex Delivery Anes PTL Lv   1 Current              Social History     Socioeconomic History   • Marital status: Single     Spouse name: Not on file   • Number of children: Not on file   • Years of education: Not on file   • Highest education level: Not on file   Occupational History   • Not on file   Tobacco Use   • Smoking status: Never Smoker   • Smokeless tobacco: Never Used   Vaping Use   • Vaping Use: Never used   Substance and Sexual Activity   • Alcohol use: Not Currently     Comment: very rare. Has not had a drink in the last 6 months.    • Drug use: No   • Sexual activity: Yes     Partners: Male     Comment:     Other Topics Concern   • Not on file   Social History Narrative    ** Merged History Encounter **          Social Determinants of Health     Financial Resource Strain:    • Difficulty of Paying Living Expenses: Not  on file   Food Insecurity:    • Worried About Running Out of Food in the Last Year: Not on file   • Ran Out of Food in the Last Year: Not on file   Transportation Needs:    • Lack of Transportation (Medical): Not on file   • Lack of Transportation (Non-Medical): Not on file   Physical Activity:    • Days of Exercise per Week: Not on file   • Minutes of Exercise per Session: Not on file   Stress:    • Feeling of Stress : Not on file   Social Connections:    • Frequency of Communication with Friends and Family: Not on file   • Frequency of Social Gatherings with Friends and Family: Not on file   • Attends Church Services: Not on file   • Active Member of Clubs or Organizations: Not on file   • Attends Club or Organization Meetings: Not on file   • Marital Status: Not on file   Intimate Partner Violence:    • Fear of Current or Ex-Partner: Not on file   • Emotionally Abused: Not on file   • Physically Abused: Not on file   • Sexually Abused: Not on file   Housing Stability:    • Unable to Pay for Housing in the Last Year: Not on file   • Number of Places Lived in the Last Year: Not on file   • Unstable Housing in the Last Year: Not on file     Allergies: Codeine and Vicodin [apap-fd&c yellow #10 [place]-hydrocodone]    Current Facility-Administered Medications:   •  LR infusion, , Intravenous, Continuous, Christine Uribe M.D.  •  fentaNYL (SUBLIMAZE) injection 50 mcg, 50 mcg, Intravenous, Q HOUR PRN, Christine Uribe M.D.  •  fentaNYL (SUBLIMAZE) injection 100 mcg, 100 mcg, Intravenous, Q HOUR PRN, Christine Uribe M.D.  •  oxytocin (PITOCIN) infusion (for induction), 0.5-20 teo-units/min, Intravenous, Continuous, Christine Uribe M.D.    Prenatal care with Dr uribe starting at 10 with following problems:  Patient Active Problem List    Diagnosis Date Noted   • Labor and delivery indication for care or intervention 01/01/2022   • Less than 8 weeks gestation of pregnancy 05/07/2021   • Sensorineural  "hearing loss 02/18/2020   • Extrinsic asthma 02/18/2020   • ASCUS with positive high risk HPV cervical 11/25/2015   • Vasodepressor syncope 08/06/2013   • Family hx-breast malignancy 03/21/2013   • Eating disorder 02/16/2012   • Depression 02/16/2012   • Vaginitis and vulvovaginitis 02/17/2009   • Other acne 02/04/2009   • Dizziness and giddiness 01/23/2009               Objective:      /78   Pulse 94   Temp 36.7 °C (98 °F) (Temporal)   Resp 17   Ht 1.6 m (5' 3\")   Wt 74.8 kg (165 lb)   SpO2 98%     General:   no acute distress   Skin:   normal   HEENT:  Neck supple with midline trachea   Lungs:   CTA bilateral   Heart:   S1, S2 normal, no murmur, click, rub or gallop, regular rate and rhythm, brisk carotid upstroke without bruits, peripheral pulses very brisk, chest is clear without rales or wheezing, no pedal edema, no JVD, no hepatosplenomegaly   Abdomen:   gravid, NT   EFW:  7 lbs   Pelvis:  adequate with gynecoid pelvis   FHT:  130 BPM   Uterine Size: S=D   Presentations: Cephalic   Cervix:     Dilation: 2cm    Effacement: 75%    Station:  0    Consistency: Medium    Position: Middle     Lab Review  Lab:   Blood type: B-/-     Recent Results (from the past 5880 hour(s))   POCT Urinalysis    Collection Time: 05/06/21  8:30 AM   Result Value Ref Range    POC Color dark yellow Negative    POC Appearance cloudy Negative    POC Leukocyte Esterase negative Negative    POC Nitrites negative Negative    POC Urobiligen 0.2 Negative (0.2) mg/dL    POC Protein negative Negative mg/dL    POC Urine PH 7.0 5.0 - 8.0    POC Blood negative Negative    POC Specific Gravity 1.025 <1.005 - >1.030    POC Ketones negative Negative mg/dL    POC Bilirubin negative Negative mg/dL    POC Glucose negative Negative mg/dL   POCT Pregnancy    Collection Time: 05/06/21  8:30 AM   Result Value Ref Range    POC Urine Pregnancy Test positive Negative    Internal Control Positive Valid     Internal Control Negative Valid    EKG    " Collection Time: 21  9:38 AM   Result Value Ref Range    Report       [facility]    Test Date:  2021  Pt Name:    TIP SNYDER               Department: [facility]  MRN:        [id number]                      [facility]  Gender:     Female                       Technician: [id number]  :        [id number]                   Requested By:ER TRIAGE PROTOCOL  Order #:    [id number]                    Reading MD: EDE ROSS MD    Measurements  Intervals                                Axis  Rate:       71                           P:          26  TX:         159                          QRS:        -15  QRSD:       102                          T:          17  QT:         403  QTc:        438    Interpretive Statements  Sinus rhythm  Borderline left axis deviation  Low voltage, precordial leads  RSR' in V1 or V2, probably normal variant  Compared to ECG 10/03/2020 20:10:13  Sinus tachycardia no longer present  T-wave abnormality no longer present  Electronically Signed On 2021 12:24:43 PDT by EDE WANG MD     CBC WITH DIFFERENTIAL    Collection Time: 21  9:51 AM   Result Value Ref Range    WBC 12.0 (H) 4.8 - 10.8 K/uL    RBC 4.52 4.20 - 5.40 M/uL    Hemoglobin 14.1 12.0 - 16.0 g/dL    Hematocrit 40.7 37.0 - 47.0 %    MCV 90.0 81.4 - 97.8 fL    MCH 31.2 27.0 - 33.0 pg    MCHC 34.6 33.6 - 35.0 g/dL    RDW 40.2 35.9 - 50.0 fL    Platelet Count 222 164 - 446 K/uL    MPV 10.4 9.0 - 12.9 fL    Neutrophils-Polys 79.00 (H) 44.00 - 72.00 %    Lymphocytes 15.20 (L) 22.00 - 41.00 %    Monocytes 3.50 0.00 - 13.40 %    Eosinophils 1.10 0.00 - 6.90 %    Basophils 0.30 0.00 - 1.80 %    Immature Granulocytes 0.90 0.00 - 0.90 %    Nucleated RBC 0.00 /100 WBC    Neutrophils (Absolute) 9.47 (H) 2.00 - 7.15 K/uL    Lymphs (Absolute) 1.82 1.00 - 4.80 K/uL    Monos (Absolute) 0.42 0.00 - 0.85 K/uL    Eos (Absolute) 0.13 0.00 - 0.51 K/uL    Baso (Absolute) 0.04 0.00 -  0.12 K/uL    Immature Granulocytes (abs) 0.11 0.00 - 0.11 K/uL    NRBC (Absolute) 0.00 K/uL   BASIC METABOLIC PANEL    Collection Time: 07/07/21  9:51 AM   Result Value Ref Range    Sodium 137 135 - 145 mmol/L    Potassium 3.6 3.6 - 5.5 mmol/L    Chloride 103 96 - 112 mmol/L    Co2 23 20 - 33 mmol/L    Glucose 83 65 - 99 mg/dL    Bun 6 (L) 8 - 22 mg/dL    Creatinine 0.47 (L) 0.50 - 1.40 mg/dL    Calcium 9.0 8.4 - 10.2 mg/dL    Anion Gap 11.0 7.0 - 16.0   ESTIMATED GFR    Collection Time: 07/07/21  9:51 AM   Result Value Ref Range    GFR If African American >60 >60 mL/min/1.73 m 2    GFR If Non African American >60 >60 mL/min/1.73 m 2   URINALYSIS CULTURE, IF INDICATED    Collection Time: 07/07/21 11:15 AM    Specimen: Urine   Result Value Ref Range    Color Yellow     Character Cloudy (A)     Specific Gravity 1.020 <1.035    Ph 8.5 (A) 5.0 - 8.0    Glucose Negative Negative mg/dL    Ketones 15 (A) Negative mg/dL    Protein Negative Negative mg/dL    Bilirubin Negative Negative    Nitrite Negative Negative    Leukocyte Esterase Trace (A) Negative    Occult Blood Negative Negative    Micro Urine Req Microscopic    URINE MICROSCOPIC (W/UA)    Collection Time: 07/07/21 11:15 AM   Result Value Ref Range    WBC 2-5 /hpf    RBC 0-2 /hpf    Bacteria Few (A) None /hpf    Epithelial Cells Moderate (A) Few /hpf    Amorphous Crystal Many /hpf        Assessment:   Maria E Cisneros at 38w4d  Labor status: SROM  Obstetrical history significant for   Patient Active Problem List    Diagnosis Date Noted   • Labor and delivery indication for care or intervention 01/01/2022   • Less than 8 weeks gestation of pregnancy 05/07/2021   • Sensorineural hearing loss 02/18/2020   • Extrinsic asthma 02/18/2020   • ASCUS with positive high risk HPV cervical 11/25/2015   • Vasodepressor syncope 08/06/2013   • Family hx-breast malignancy 03/21/2013   • Eating disorder 02/16/2012   • Depression 02/16/2012   • Vaginitis and vulvovaginitis  02/17/2009   • Other acne 02/04/2009   • Dizziness and giddiness 01/23/2009   .      Plan:     Admit to L&D  GBS negative

## 2022-01-02 NOTE — ANESTHESIA PREPROCEDURE EVALUATION
Date: 22  Procedure: Labor Epidural       25yo  at 38 5/7 weeks, here with SROM and in active labor, requesting epidural for pain control    Relevant Problems   PULMONARY   (positive) Extrinsic asthma (well controlled)      Other   (positive) Sensorineural hearing loss (hearing aides)       Physical Exam    Airway   Mallampati: I  TM distance: >3 FB  Neck ROM: full       Cardiovascular - normal exam  Rhythm: regular  Rate: normal  (-) murmur     Dental - normal exam           Pulmonary - normal exam  Breath sounds clear to auscultation     Abdominal    Neurological - normal exam                 Anesthesia Plan    ASA 2       Plan - epidural   Neuraxial block will be labor analgesia                  Pertinent diagnostic labs and testing reviewed    Informed Consent:    Anesthetic plan and risks discussed with patient.

## 2022-01-02 NOTE — PROGRESS NOTES
2315-pt  here for SROM at 2230 with clear fluid. Oriented to room. Admission orders initiated  15-bedside report given to LINDA Brennan

## 2022-01-02 NOTE — PROGRESS NOTES
0700- rec'd report from cinthia ferrera. Pt holding baby skin to skin and offers no complaints. Bleeding scant to little and fudus firm. Will move to bathroom at 8am to void. Pt aware of poc    0800- pt ambulated to bathroom and voided small amount. pericare given and pt tolerated well. Epidural cath removed with tip intact    0820- pt to pp and bedside report given to cristobal ferrera.

## 2022-01-02 NOTE — ANESTHESIA PROCEDURE NOTES
Epidural Block    Date/Time: 1/2/2022 1:44 AM  Performed by: Marianne Miller M.D.  Authorized by: Marianne Miller M.D.     Patient Location:  OB  Start Time:  1/2/2022 1:44 AM  End Time:  1/2/2022 1:52 AM  Reason for Block: labor analgesia    patient identified, IV checked, site marked, risks and benefits discussed, surgical consent, monitors and equipment checked, pre-op evaluation and timeout performed    Patient Position:  Sitting  Prep: ChloraPrep, patient draped and sterile technique    Monitoring:  Blood pressure, continuous pulse oximetry and heart rate  Approach:  Midline  Location:  L3-L4  Injection Technique:  LEAH air and LEAH saline  Skin infiltration:  Lidocaine  Strength:  1%  Dose:  3ml  Needle Type:  Tuohy  Needle Gauge:  17 G  Needle Length:  3.5 in  Loss of resistance::  5  Catheter Size:  19 G  Catheter at Skin Depth:  10.5  Test Dose Result:  Negative

## 2022-01-02 NOTE — ANESTHESIA TIME REPORT
Anesthesia Start and Stop Event Times     Date Time Event    1/2/2022 0139 Anesthesia Start     0559 Anesthesia Stop        Responsible Staff  01/02/22    Name Role Begin End    Marianne Miller M.D. Anesth 0139 0559        Preop Diagnosis (Free Text):  Pre-op Diagnosis             Preop Diagnosis (Codes):    Premium Reason  E. Weekend    Comments:

## 2022-01-02 NOTE — PROGRESS NOTES
Patient brought from labor and delivery via wheelchair.  Patient oriented to room and surroundings. Id bands and security issues discussed. Including not letting anyone take infant without pink photo id. No sleeping with infant, no carrying infant in halls, and no leaving infant unattended. 0-10 pain scale and pain management discussed. Fundus firm with light lochia. IV infusing without redness or swelling.  Family at bedside.  Patient encouraged to call before getting out of bed until stable.  Patient claims she will call for medications or any needs.

## 2022-01-02 NOTE — L&D DELIVERY NOTE
Procedure:   w-w/o: with repair of laceration    Maria E Cisneros is a 26 y.o.  at [id number] is an established patient of .  Patient was admitted for PROM.  Patient received  Pitocin for augmentation of labor process.  She progressed to complete/complete and a +2 station.  Patient then pushed with the fetal head delivering over intact Yes,  perineum.  Fetal head delivered in an OA position.  Nuchal cord x 3 reduced.  Anterior and posterior shoulders then delivered without complications with the rest of the body to follow.    The infant female was placed on the maternal abdomen and attended by awaiting nursing staff.  The Apgars were 6 at 1 minute and 6 at 5 minutes.  After approximately 1 to 3 minutes the cord was then clamped x2 and cut.  The placenta delivered spontaneously, Castellanos ,intact with a three-vessel cord.  There was a 2 nd degree perineal.  Lacerations were repaired with 2-0 chromic and 3-0 chromic in the standard fashion.  Estimated blood loss 300 cc  Sponge count correct  Mother to recover in labor and delivery, infant to be transferred to well-baby nursery.

## 2022-01-02 NOTE — ANESTHESIA POSTPROCEDURE EVALUATION
Patient: Maria E Cisneros    Procedure Summary     Date: 01/02/22 Room / Location:     Anesthesia Start: 0139 Anesthesia Stop: 0559    Procedure: Labor Epidural Diagnosis:     Scheduled Providers:  Responsible Provider: Marianne Miller M.D.    Anesthesia Type: epidural ASA Status: 2          Final Anesthesia Type: epidural  Last vitals  BP   Blood Pressure: 116/72    Temp   36.8 °C (98.2 °F)    Pulse   88   Resp   17    SpO2   100 %      Anesthesia Post Evaluation    Patient location during evaluation: floor  Patient participation: complete - patient participated  Level of consciousness: awake and alert    Airway patency: patent  Anesthetic complications: no  Cardiovascular status: hemodynamically stable  Respiratory status: acceptable  Hydration status: euvolemic    PONV: none          No complications documented.     Nurse Pain Score: 0 (NPRS)

## 2022-01-03 VITALS
DIASTOLIC BLOOD PRESSURE: 72 MMHG | OXYGEN SATURATION: 96 % | RESPIRATION RATE: 18 BRPM | HEIGHT: 63 IN | BODY MASS INDEX: 29.23 KG/M2 | SYSTOLIC BLOOD PRESSURE: 125 MMHG | TEMPERATURE: 98 F | HEART RATE: 89 BPM | WEIGHT: 165 LBS

## 2022-01-03 LAB — ACTION RH IMMUNE GLOB 8505RHG: NORMAL

## 2022-01-03 PROCEDURE — A9270 NON-COVERED ITEM OR SERVICE: HCPCS | Performed by: OBSTETRICS & GYNECOLOGY

## 2022-01-03 PROCEDURE — 700102 HCHG RX REV CODE 250 W/ 637 OVERRIDE(OP): Performed by: OBSTETRICS & GYNECOLOGY

## 2022-01-03 RX ADMIN — IBUPROFEN 600 MG: 600 TABLET ORAL at 05:30

## 2022-01-03 ASSESSMENT — EDINBURGH POSTNATAL DEPRESSION SCALE (EPDS)
I HAVE FELT SAD OR MISERABLE: NO, NOT AT ALL
I HAVE BEEN ABLE TO LAUGH AND SEE THE FUNNY SIDE OF THINGS: AS MUCH AS I ALWAYS COULD
I HAVE LOOKED FORWARD WITH ENJOYMENT TO THINGS: AS MUCH AS I EVER DID
I HAVE FELT SCARED OR PANICKY FOR NO GOOD REASON: NO, NOT AT ALL
I HAVE BLAMED MYSELF UNNECESSARILY WHEN THINGS WENT WRONG: NO, NEVER
THINGS HAVE BEEN GETTING ON TOP OF ME: NO, I HAVE BEEN COPING AS WELL AS EVER
I HAVE BEEN ANXIOUS OR WORRIED FOR NO GOOD REASON: NO, NOT AT ALL
I HAVE BEEN SO UNHAPPY THAT I HAVE HAD DIFFICULTY SLEEPING: NOT AT ALL
THE THOUGHT OF HARMING MYSELF HAS OCCURRED TO ME: NEVER
I HAVE BEEN SO UNHAPPY THAT I HAVE BEEN CRYING: NO, NEVER

## 2022-01-03 NOTE — LACTATION NOTE
This note was copied from a baby's chart.  Mother reports one successful feed overnight, infant remains sleepy, mother hand expressed colostrum for baby overnight and held skin to skin frequently. Attempted breast, infant sleepy, reviewed cross cradle positioning, infant held nipple in mouth but did not initiate suck. Initiated pumping due to latch difficulties/sleepiness at greater than 24 hours of age. Colostrum removed easily with pump, 25 mm flanges, 30% suction to comfort. Primary RN to review paced bottle feeding when mother finished pumping and LC to follow-up for feeding plan.

## 2022-01-03 NOTE — DISCHARGE INSTRUCTIONS
PATIENT DISCHARGE EDUCATION INSTRUCTION SHEET    REASONS TO CALL YOUR OBSTETRICIAN  · Persistent fever, shaking, chills (Temperature higher than 100.4) may indicate you have an infection  · Heavy bleeding: soaking more than 1 pad per hour; Passing clots an egg-sized clot or bigger may mean you have an postpartum hemorrhage  · Foul odor from vagina or bad smelling or discolored discharge or blood  · Breast infection (Mastitis symptoms); breast pain, chills, fever, redness or red streaks, may feel flu like symptoms  · Urinary pain, burning or frequency  · Incision that is not healing, increased redness, swelling, tenderness or pain, or any pus from episiotomy or  site may mean you have an infection  · Redness, swelling, warmth, or painful to touch in the calf area of your leg may mean you have a blood clot  · Severe or intensified depression, thoughts or feelings of wanting to hurt yourself or someone else   · Pain in chest, obstructed breathing or shortness of breath (trouble catching your breath) may mean you are having a postpartum complication. Call your provider immediately   · Headache that does not get better, even after taking medicine, a bad headache with vision changes or pain in the upper right area of your belly may mean you have high blood pressure or post birth preeclampsia. Call your provider immediately    HAND WASHING  All family and friends should wash their hands:  · Before and after holding the baby  · Before feeding the baby  · After using the restroom or changing the baby's diaper    WOUND CARE  · Episiotomy/Laceration  · May use del-spray bottle, witch hazel pads and dermaplast spray for comfort  · Use del-spray bottle after urinating to cleanse perineal area  · To prevent burning during urination spray del-water bottle on labial area   · Pat perineal area dry until episiotomy/laceration is healed  · Continue to use del-bottle until bleeding stops as needed  · If have a 2nd degree  laceration or greater, a Sitz bath can offer relief from soreness, burning, and inflammation   · Sitz Bath   · Sit in 6 inches of warm water and soak laceration as needed until the laceration heals    VAGINAL CARE AND BLEEDING  · Nothing inside vagina for 6 weeks:   · No sexual intercourse, tampons or douching  · Bleeding may continue for 2-4 weeks. Amount and color may vary  · Soaking 1 pad or more in an hour for several hours is considered heavy bleeding  · Passing large egg sized blood clots can be concerning  · If you feel like you have heavy bleeding or are having increasing amount of blood clots call your Obstetrician immediately  · If you begin feeling faint upon standing, feeling sick to your stomach, have clammy skin, a really fast heartbeat, have chills, start feeling confused, dizzy, sleepy or weak, or feeling like you're going to faint call your Obstetrician immediately    HYPERTENSION   Preeclampsia or gestational hypertension are types of high blood pressure that only pregnant women can get. It is important for you to be aware of symptoms to seek early intervention and treatment. If you have any of these symptoms immediately call your Obstetrician    · Vision changes or blurred vision   · Severe headache or pain that is unrelieved with medication and will not go away  · Persistent pain in upper abdomen or shoulder   · Increased swelling of face, feet, or hands  · Difficulty breathing or shortness of breath at rest  · Urinating less than usual    URINATION AND BOWEL MOVEMENTS  · Eating more fiber (bran cereal, fruits, and vegetables) and drinking plenty of fluids will help to avoid constipation  · Urinary frequency and urgency after childbirth is normal  · If you experience any urinary pain, burning or frequency call your provider    BIRTH CONTROL  · It is possible to become pregnant at any time after delivery and while breastfeeding  · Plan to discuss a method of birth control with your physician at  "your post delivery follow up visit    POSTPARTUM BLUES  During the first few days after birth, you may experience a sense of the \"blues\" which may include impatience, irritability or even crying. These feelings come and go quickly. However, as many as 1 in 10 women experience emotional symptoms known as postpartum depression.     POSTPARTUM DEPRESSION    May start as early as the second or third day after delivery or take several weeks or months to develop. Symptoms of \"blues\" are present, but are more intense: Crying spells; loss of appetite; feelings of hopelessness or loss of control; fear of touching the baby; over concern or no concern at all about the baby; little or no concern about your own appearance/caring for yourself; and/or inability to sleep or excessive sleeping. Contact your Obstetrician if you are experiencing any of these symptoms     PREVENTING SHAKEN BABY  If you are angry or stressed, PUT THE BABY IN THE CRIB, step away, take some deep breaths, and wait until you are calm to care for the baby. DO NOT SHAKE THE BABY. You are not alone, call a supporter for help.  · [facility] 24/7 crisis call line (403-415-6598) or (1-155.457.8982)  · You can also text them, text \"ANSWER\" (549163)      "

## 2022-01-03 NOTE — CARE PLAN
The patient is Stable - Low risk of patient condition declining or worsening    Shift Goals  Clinical Goals: Locia WNL  Patient Goals: Pain control  Family Goals: Bonding    Progress made toward(s) clinical / shift goals:    Problem: Knowledge Deficit - Postpartum  Goal: Patient will verbalize and demonstrate understanding of self and infant care  Outcome: Progressing     Problem: Psychosocial - Postpartum  Goal: Patient will verbalize and demonstrate effective bonding and parenting behavior  Outcome: Progressing     Problem: Altered Physiologic Condition  Goal: Patient physiologically stable as evidenced by normal lochia, palpable uterine involution and vitals within normal limits  Outcome: Progressing     Problem: Infection - Postpartum  Goal: Postpartum patient will be free of signs and symptoms of infection  Outcome: Progressing     Problem: Pain - Standard  Goal: Alleviation of pain or a reduction in pain to the patient’s comfort goal  Outcome: Progressing       Patient is not progressing towards the following goals:

## 2022-01-03 NOTE — DISCHARGE SUMMARY
Discharge Summary:      Maria E Cisneros    Admit Date:   2022  Discharge Date:  1/3/2022     Admitting diagnosis:  Labor and delivery indication for care or intervention [O75.9]  Discharge Diagnosis: Status post vaginal, spontaneous.  Pregnancy Complications: none  Tubal Ligation:  no        History:  Past Medical History:   Diagnosis Date   • Allergy     nasal congestion, chronic   • Asthma     exercise induced asthma   • Depression      OB History    Para Term  AB Living   1 1 1 0 0 1   SAB IAB Ectopic Molar Multiple Live Births   0 0 0   0 1      # Outcome Date GA Lbr Massimo/2nd Weight Sex Delivery Anes PTL Lv   1 Term 22 38w5d / 02:07 2.625 kg (5 lb 12.6 oz) F Vag-Spont EPI N ISIAH        Codeine and Vicodin [apap-fd&c yellow #10 [place]-hydrocodone]  Patient Active Problem List    Diagnosis Date Noted   • Labor and delivery indication for care or intervention 2022   • Less than 8 weeks gestation of pregnancy 2021   • Sensorineural hearing loss 2020   • Extrinsic asthma 2020   • ASCUS with positive high risk HPV cervical 2015   • Vasodepressor syncope 2013   • Family hx-breast malignancy 2013   • Eating disorder 2012   • Depression 2012   • Other acne 2009   • Dizziness and giddiness 2009        Hospital Course:   26 y.o. , now para 1, was admitted with the above mentioned diagnosis, underwent Active Labor, vaginal, spontaneous. Patient postpartum course was unremarkable, with progressive advancement in diet , ambulation and toleration of oral analgesia. Patient without complaints today and desires discharge.      Vitals:    22 1808 22 2200 22 0200 22 0551   BP: 113/73 123/70 118/72 125/72   Pulse: 82 85 80 89   Resp: 18 18 18 18   Temp: 36.4 °C (97.5 °F) 36.7 °C (98 °F) 36.8 °C (98.2 °F) 36.7 °C (98 °F)   TempSrc: Temporal Temporal Temporal Temporal   SpO2: 93% 95% 94% 96%   Weight:        Height:           Current Facility-Administered Medications   Medication Dose   • ondansetron (ZOFRAN ODT) dispertab 4 mg  4 mg    Or   • ondansetron (ZOFRAN) syringe/vial injection 4 mg  4 mg   • oxytocin (PITOCIN) infusion (for postpartum)   mL/hr   • ibuprofen (MOTRIN) tablet 600 mg  600 mg   • ropivacaine 0.2 % (NAROPIN) injection     • LR infusion     • tetanus-dipth-acell pertussis (Tdap) inj 0.5 mL  0.5 mL   • PRN oxytocin (PITOCIN) (20 Units/1000 mL) PRN for excessive uterine bleeding - See Admin Instr  125-999 mL/hr   • miSOPROStol (CYTOTEC) tablet 600 mcg  600 mcg   • methylergonovine (METHERGINE) injection 0.2 mg  0.2 mg   • carboPROST (HEMABATE) injection 250 mcg  250 mcg   • docusate sodium (COLACE) capsule 100 mg  100 mg   • LR infusion     • oxytocin (PITOCIN) infusion (for induction)  0.5-20 teo-units/min       Exam:  Breast Exam: negative  Abdomen: Abdomen soft, non-tender. BS normal. No masses,  No organomegaly  Fundus Non Tender: yes  Incision: dry and intact  Perineum: perineum intact  Extremity: extremities, peripheral pulses and reflexes normal     Labs:  Recent Labs     01/01/22  2345 01/02/22  1434   WBC 11.9* 16.9*   RBC 4.07* 4.14*   HEMOGLOBIN 11.9* 12.2   HEMATOCRIT 35.6* 36.4*   MCV 87.5 87.9   MCH 29.2 29.5   MCHC 33.4* 33.5*   RDW 40.3 41.1   PLATELETCT 236 245   MPV 11.3 11.2        Activity:   Discharge to home  Pelvic Rest x 6 weeks    Assessment:  normal postpartum course  Discharge Assessment: No areas of skin breakdown/redness; surgical incision intact/healing     Follow up: .Rony 6 weeks      Discharge Meds:   No current outpatient medications on file.       Christine Uribe M.D.

## 2022-01-03 NOTE — DISCHARGE PLANNING
Discharge Planning Assessment Post Partum    Reason for Referral: HX of depression for MOB    Address: [address] [place] [address] [id number] [facility]    Phone number:[phone]    Type of Living Situation:Stable    Mom Diagnosis: Labor and delivery     Baby Diagnosis:      Primary Language: English    Name of Baby: Frida Pickard :22    Father of the Baby: Miguel Pickard    Involved in baby’s care? Yes    Contact Information: [phone]    Prenatal Care: Yes    Mom's PCP: Michelle Orellana APRN    PCP for new baby:[facility]    Support System: Good support from family     Coping/Bonding between mother & baby: MOB bonding appropriately woth baby at bedside.     Source of Feeding: Breastfeeding    Supplies for Infant: Has all needed supplies no concern     Mom's Insurance: [employer]     Baby Covered on Insurance:Yes    Mother Employed/School: [employer] work    Other children in the home/names & ages: First baby    Financial Hardship/Income: None identified     Mom's Mental status: Stable and appropriate     Services used prior to admit: None reported     CPS History: None reported     Psychiatric History: HX of depression    Domestic Violence History: None identified     Drug/ETOH History: None identified     Resources Provided: Provided community resources and postpartum depression resources.    Referrals Made: None      Clearance for Discharge: Baby is cleared to discharge with MOB and FOB when medically cleared     1/3/22

## 2022-01-03 NOTE — LACTATION NOTE
This note was copied from a baby's chart.  Mother's first baby, has had some successful feeds since birth early this morning, feeling some pinching with feeds, discussed techniques to achieve deep latch, infant currently skin to skin for cold temp, taught hand expression with infant skin to skin and large drops colostrum easily removed. Plan for lots of skin to skin time and feeding attempts, hand express colostrum and feed to baby if sleepy and not latching during the first 24 hours of life. Request RN assist with feedings as needed. Lactation to follow.

## 2022-01-03 NOTE — CARE PLAN
The patient is Stable - Low risk of patient condition declining or worsening    Shift Goals  Clinical Goals: Pain management  Patient Goals: To be able to feed infant.    Progress made toward(s) clinical / shift goals:  Patient have a good pain control. She was able to feed infant. She also using the 3-steps plan and was able to pump a good amount.     Patient is not progressing towards the following goals:

## 2022-01-03 NOTE — LACTATION NOTE
This note was copied from a baby's chart.  Follow-up visit, mother able to pump 10mL colostrum and baby drank well from bottle per RN and maternal report. Feeding plan is to continue to practice breastfeeding then double pump and supplement EBM/formula per feeding volume guidelines every 3 hours or sooner for hunger cues. Mother plans to rent HG pump at discharge. [facility] for follow-up breastfeeding support after discharge. Parents deny questions/concerns.

## 2022-01-03 NOTE — PROGRESS NOTES
12-hour chart check done. MAR and notes reviewed.     Report received from Ingrid MCKEON @ 5436. Patient was asleep. Assumed care.     @ 0830: Discussed plan of care to patient. Assessment done. She's ambulating well and bonding well with infant. FOB is at the bedside.     @ 1330: Discharge instruction for mom and baby discussed. Emphasized the importance of  screening follow-up test. Questions and concerns have been answered.

## 2022-01-07 ENCOUNTER — OFFICE VISIT (OUTPATIENT)
Dept: OBGYN | Facility: CLINIC | Age: 27
End: 2022-01-07
Payer: COMMERCIAL

## 2022-01-07 DIAGNOSIS — O92.70 LACTATION PROBLEM: ICD-10-CM

## 2022-01-07 DIAGNOSIS — O92.29 SORE NIPPLES DUE TO LACTATION: ICD-10-CM

## 2022-01-07 PROCEDURE — 99205 OFFICE O/P NEW HI 60 MIN: CPT | Performed by: NURSE PRACTITIONER

## 2022-01-07 NOTE — PROGRESS NOTES
Summary:  Baby not latching well in the hospital, triple feeding started, home with pump and plan.Milk supply building abundantly, nipples , baby latching better but not well consistently. Milk saved up, infant growing well.   Today: Offered left bare breast, tried positioning techniques but baby shallow and off and on. Offered a 24mm NS and latched baby, responded well and removed 27ml. Not a full feeding although milk available. Offered right in football position and removed an additional 21ml - total 48ml, spit up with being dressed about 3ml. Infant content.   Plan:Mom is establishing milk well, NS is facilitating latch. Continue breastfeeding and if assessed by parents to be a good feeding, don't pump after that feeding but will after the next feeding. Do offer both breasts for now. If feeding isn't good based on todays evaluation providing feedback, bottle and pump. Survive nights, it may not be the time to practice breastfeeding, you may prefer it to pumping - parent decision.   Follow up:   Lactation appointment :22  Pediatrician appointment:22  Referrals :None  Subjective:   Maria E Cisneros is a 26 y.o. female here for lactation care. She is here today with baby and  (Alf).    Concerns:   Latch on difficulties , engorgement, nipple pain , sleepy baby and baby always seems hungry at night  HPI:   Pertinent  history:   Mother does not have a history of advanced maternal age, GDM, hypertension prior to pregnancy, insulin resistance, multiple gestation, PCOS and thyroid disease. Common condition(s) which may interfere with milk supply.    Breast changes in pregnancy: Yes  History of breast surgeries: No      FEEDING HISTORY:    Past breastfeeding history:  First baby   Hospital course: baby not latching well in the hospital, triple feeding started, home with pump and plan.  Currently 2022 Milk supply building abundantly, nipples , baby latching  better but not well consistently.    Both breasts: Yes  Bottle feeds: topping off after most feedings/24h    Supplement: Supplementation initiated inpatient and Expressed breast milk  Quantity: following hospital guidelines  How given/devices:  Bottle    Nipple Shield Use: None    Breast Pumping:   Frequency: about 8x/day  Type of pump: Platinum  Flange size/type: 25mm  NO pain with pumping 31%    Maternal ROS:  Constitutional: No fever, chills. Feeling well  Breasts: Soreness of breasts and Soreness of nipples  Psychiatric: Managing ok  Mental Health: No mention of feeling irritable, agitated, angry, overwhelmed, apathy, exhaustion nor having sleep changes outside infant feeds/demands or appetite changes       Objective:     Maternal Physical   General: No acute distress  Breasts: Symetrical , Full, Plugged Duct - no evidence and Mastitis  - no S/S  Nipples: erythematous  Psychiatric:  Normal mood and affect. Her behavior is normal. Judgment and thought content normal   Mental Health:  Did NOT exhibit sadness, crying, feeling overwhelmed, agitation or hypervigilance.     Assessment/Plan & Lactation Counseling:     Infant exam on infant chart    Infant Weight History:   1/2/22 5#12.6oz  1/5/22 5#9oz Ped office  1/7/2022  5#10.2oz    Infant intake at Breast:: 27ml left      21ml right    Total: 48ml  Milk Transfer at this feeding:   Effective breastfeeding  Pumped: Type of Pump: Medela    Quantity Pumped: L 25ml    R 25ml    Total: 50ml  Initiation of Feeding: Infant initiates and Needed to be roused  Position of Feeding:    Right: football  Left: cross cradle  Attachment Achieved: with difficulty  Nipple shield:     Size: 24mm       Introduced today  Latch achieved yes  Suck Pattern at the breast: Suck burst and normal rest  Suck Pattern on the bottle:N/A  Behavior Following Observed Feeding: sleeping  Nipple Pain: tenderness from latch    Latch: Mom latches independently, Assisted latch and Latch difficulty without  nipple shield  Suckling/Feeding: attaches, audible swallows, baby fed effectively, baby roots, elicits FRANCES, frequent pauses and intermittent swallows  Milk Supply Available: normal and building    Maternal Diagnosis/Problem:  Sore nipples  Lactation problem - difficulty at the breast    BREASTFEEDING PLAN  Discussed concerns and symptoms as listed above in assessment and guidance summarized below.  Shared decision making was used between myself and the family for this encounter, home care, and follow up.  Topics reviewed included:    •  Sleep or lack of: discussed strategies to manage restorative sleep, although short amounts, significant to the mental health of the mother.   o Mom goes to sleep right after 8pm +/- feeding  o Partner covers first late evening feeding (10pm/11pm), settles baby,  then goes to bed  o Mom covers next feeding 1am /2am, partner sleeps  o Next feeding share  •  Self -care through relational support and interaction.   o Encouraged  support, rest, getting out of the house each day, walk or drive somewhere,  a coffee/tea at a drive through  o Allow others to bring you food, help with chores and errands, meeting for a cup of coffee  • Milk supply is dependent on glandular tissue development, hormonal influences, how many times the baby removes milk and how well the breasts are emptied in a 24 hour period. This is a biological reality that we can NOT work around. If, for any reason, your baby is not latching, or you are not able to nurse, then it is important for you to remove the milk instead by pumping or hand expression.  There's no magic trick, tea, food, drink, cookie or supplement that will increase your milk supply. One  must  effectively remove milk to continue to make and maximize milk. In the early days and weeks that can be 8+ times in 24 hours. For older babies, on average 6-7 + times in 24 hours.    •  Feeding:   o Feed your baby every 1.5-3 hours in the day, more often if  baby acts hungry.   o Awaken baby for feeding if going over 3 hours in the day.   o Until back to birth weight, ONE four hour at night is acceptable if has had 8 prior feedings in 24 hours.    o Need to get in 8-12 feedings per 24 hours.   o  Given infants weight you may allow baby to go longer at night starting Sunday,  but that generally means shorter durations in the day.    •  Supplement:   • Supplement with expressed milk    •  When bottle-feeding, there are three primary things to consider:    o Nipple Shape:  - Look for a nipple that looks like a “breast at work” not a “breast at rest.”  A “breast at work” has a somewhat cone shape as the nipple and breast tissue is pulled into the baby’s mouth while feeding.  Your baby’s mouth should be able to go around the widest part of the nipple to form a wide-open gape on the bottle like that of a good latch at the breast. In contrast, a breast at rest might look more like, well, a breast: a roundish base with a  nipple.  If the bottle looks like this, your baby’s lips may not be able to get around the widest part of the nipple because it is just too wide resulting in a narrow gape that would hurt your nipple. This nipple shape may also make it difficult for your baby to make a complete seal with their lips which leads to air intake and milk spillage.Wide or narrow neck bottles are your choice.   o Flow Rate of the Nipple:  - The nipple flow should be slow.  Don’t just read the label, but notice how your baby is feeding and trust what you observe.  A study done a few years ago found that “slow flow” varied widely between brands and even between nipples of the same brand.  Try several nipples until you find one that results in a rhythmic sucking pattern but not chugging and gulping.  o Pacing the feeding:  - A slow flow nipple helps, but how you feed the baby is more important.  Good positioning can compensate for a faster flow nipple.  When bottle-feeding, the baby  should control how much is consumed at a feeding.  Holding the baby in an upright position with the bottle horizontal ensures that the baby gets milk only when sucking.  Here is a nice video demonstrating this concept of paced bottle feeding,  https://www.youtube.com/watch?v=EjUTI2nKS0K    •  Pacifier Use:  The American [employer]ademy of Pediatitians' Position Paper reports: Although we recommend a conservative approach regarding pacifier use, we do not endorse a complete ban on the use of pacifiers, nor do we support an approach that induces parental guilt concerning their choices about the use of pacifiers.    •  Nipple shield (NS): We prefer the 24mm Medela.   o Before applying, roll the shield in on itself (like a sombrero, and allow breast to be pulled  in to the shield tip.   o The latch is very different from the bare breast, bring the baby to you and let them find the nipple shield and work it out.   o Once you and your baby are familiar with the NS, you may be able to just put it on the breast and latch the baby without any preparation.    • Positioning Techniques for bare breast  o Suggested positions Cross cradle, Football and Laid Back  o Fine tune position by making sure your fingers beneath the breast as well as your bra, are out of the way of your baby's chin.  o Positioning:  Many positions shown, great sidelying at 7 minutes.   o See http://globalhealthmedia.org/portfolio-items/positions-for-breastfeeding/?abneygdbaAP=69133    •  Latch on Techniques for bare breast.   o Modify for nipple shield use soon enough you will move back to bare breast.  o Fine tune latch:  - By holding your baby more securely at the breast, assisting your baby to stay attached by:  • Bringing your baby to your breast, not breast to the baby  • Your baby's cheek to touch breast securely, nose tipped back  • Hold your baby firmly in place so when your baby forgets to suck and picks it back up again your baby is in the correct spot.  "You will be extinguishing behavior and replacing it with a deeper latch to stimulate suck and provide satisfaction at the breast.  • Your baby needs as much breast as deep in the mouth as possible to allow your nipples to heal and for you more importantly to maximize efficiency at the breast  o Latch is asymmetrical, leading with the chin, getting the baby below the breast, as if offering a large \"deli severiano sandwich\".    •  Triple feeding: A short term solution: Breast/bottle/pump:  o FIRST decide what you can do at that feeding and you may decide to double feed -pump and bottle, if you are going to offer the breast at that feeding:  - nurse first and limit time on the breasts to 20+/- min total  - finish with bottle  - pump afterwards to finish emptying your breasts which will help increase milk supply  • As baby becomes more effective, evidenced by not pumping much milk after a breastfeeding, we will create a plan to decrease pumping.  - use your own pumped milk, formula or donor human milk    •  Pumping Guidelines:  o Both breasts   o Pump 4-6 times in 24 hours, skipping a pumping after a good breastfeeding  o Type of pump:  - Platinum and Medela  [employer] [place] Settings http://www.[email or web address]/healthcare-professionals/videos/ameda-platinum-with-hygienikit-video  1. Speed: Start at 80 then turn down to 60 after 1.5-2 minutes when you see milk in the flange channel  2. Suction: To comfort, goal of  31%. NEVER to discomfort.   § May return this weekend to the [facility], the store is not open  - Always double pump  o How long will vary woman to woman, typically 8-15 minutes, or 1-2 minutes after flow slows  o Flange Fit: Freely moving nipple in the tunnel with some movement of the areola.  - Today's evaluation indicates appropriate flange    • Storage (Acceptable guidelines for healthy term babies)  o 10 hours at room temp including your pieces, may rinse but not mandatory  o 8 days refrigerator, don't need  to " refrigerate right away if using fresh pumped milk at the next feeding  o Freezer 1 year  o Deep freezer 2 years  o American Academy or Pediatrics has said you may mix different temperature milks.   o If baby drinks breastmilk from a fresh or refrigerated bottle of breastmilk,  you may return the unused portion to the refrigerator and use once at next feeding.     • Nipple care:  • May apply breastmilk  • Moist-oily ointment after feeding/pumping, ie Lanolin nipple butter, coconut or olive oil, if desired/needed 2-3 times/day until nipples are healed  • You do not need to wash this off before pumping or feeding the baby  • You may want to remove baby from breast when active swallowing stops to avoid prolonged nipple compression if on bare breast    •  Breast Care  o Engorgement resolving  - Ibuprofen 600mg every 6 hours with food to facilitate decreasing inflammation   - May use heat to facilitate letdown  - Cold after if still firm  • Pump to comfort    •  Connect with other mothers:  o Facebook:   - [place] [facility]: https://www.[email or web address].com/[place].breastfeeds/  - Well-Nourished Babies (Private group for questions and support): https://www.facebook.com/groups/334680906572179/  o [facility] [place] [facility]  - Tuesdays 10am - 11am. [facility] at [address], 3rd floor conference room  - Come and check your baby's weight, do a feeding and see how your baby is growing, visit with other mothers, plan on a walk or coffee date after group.  • Please wear a mask  • Due to space limitations - no strollers please (Fresh c/section moms should use the stroller)  • We would love to have dads stay, but moms won't breastfeed.  • The room is only available from 10am -11am, there is a meeting prior and after.   • All diapers must be taken with you   o [facility] [place] [facility]  - This is an emotional, informational and safe support Craig with your like-minded community that builds solidarity among moms  - The  honest experiences of mothers are highly valued among the other mothers  - Tuesday  at 11am by Daniel,  you will be sent an invitation each week, please unsubscribe as you wish  - You may instead copy and paste this link: [email or web address]    - Passcode  [id number]  - You may share this link with friends; please don't post on social media.    In Conclusion:   Family present has verbalized what they can realistically do based on family dynamics, understanding a plan has to be doable to be effective and can be renegotiated at any time.  This is a complex and intimate journey. When obstacles present themselves, it takes confidence, persistence and support. You are now the focus of our Breastfeeding Medicine team; we are here to support your decisions and goals.      Follow up requires close monitoring in this time sensitive window of opportunity to establish milk supply and facilitate the learning of  breastfeeding.    Mom is encouraged to e-mail to update how the plan is working.    Pediatrician appointment: 1/19/22    Follow-up for infant weight check and dyad breastfeeding evaluation in 7 day(s)  Please call [phone] if you have not scheduled your next appointment    A total of 65 minutes, not including infant assessment time, with more than 50% was spent preparing to see the patient, obtaining and reviewing separately obtained history, performing a medically appropriate examination and evaluation, counseling and educating the family,  documenting clinical information in the electronic health record, independently interpreting weighted feeds and infant growth results, communicating these results to the family and care coordination as detailed in the above note.       GEORGIA Dhillon.

## 2022-01-14 ENCOUNTER — OFFICE VISIT (OUTPATIENT)
Dept: OBGYN | Facility: CLINIC | Age: 27
End: 2022-01-14
Payer: COMMERCIAL

## 2022-01-14 DIAGNOSIS — O92.70 LACTATION PROBLEM: ICD-10-CM

## 2022-01-14 PROCEDURE — 99215 OFFICE O/P EST HI 40 MIN: CPT | Performed by: NURSE PRACTITIONER

## 2022-01-14 PROCEDURE — 99417 PROLNG OP E/M EACH 15 MIN: CPT | Performed by: NURSE PRACTITIONER

## 2022-01-14 NOTE — PROGRESS NOTES
Summary:  Parents managing feedings and growth with excellence. Every other feeding breast then bottle next feeding, pumping producing enough milk to save, nipples healed. Using nipple shield, baby has not latched on the right side. Parents have accurate PIERCE scale and infrequently follow intake and weight.  Today: Baby fed about an hour ago, offered bare breast and infant latched but only non nutritive, with NS on left, removed 12ml but able to sustain to evaluate. Moved to right in football position with strategies to latch but Frida latched without much help to NS and removed an additional 12ml. She had a good snack and was asleep. Pumping not indicated  Plan:Mom has an established milk supply, NS is facilitating latch. Continue breastfeeding but do two in a row then bottle, increasing access to both breasts, one a one feeding the other at the next. May offer both if baby indicates. Will need to pump for bottles and may save some milk.  Night time do what works for the family in terms of breast or bottle.   Follow up:   Lactation appointment :as needed, encouraged Live [facility]   Pediatrician appointment:22  Referrals :None    Subjective:   Maria E Cisneros is a 26 y.o. female here for lactation care. She is here today with baby and  (Alf).    Concerns:   Feeding and weight evaluation.    HPI:   Pertinent  history:   Mother does not have a history of advanced maternal age, GDM, hypertension prior to pregnancy, insulin resistance, multiple gestation, PCOS and thyroid disease. Common condition(s) which may interfere with milk supply.    Breast changes in pregnancy: Yes  History of breast surgeries: No      FEEDING HISTORY:    Past breastfeeding history:  First baby   Hospital course: baby not latching well in the hospital, triple feeding started, home with pump and plan.  Prior to consultation on 2022 Milk supply building abundantly, nipples , baby latching better  but not well consistently.  Currently 1/14/2022 Parents managing feedings and growth with excellence. Every other feeding breast then bottle next feeding, pumping producing enough milk to save, nipples healed. Using nipple shield, baby has not latched on the right side.     Both breasts: Yes  Bottle feeds: every other feeding    Supplement: Supplementation initiated inpatient and Expressed breast milk  Quantity: 60ml  How given/devices:  Bottle    Nipple Shield Use: None    Breast Pumping:   Frequency: about 4-5x/day  Type of pump: Medela  Flange size/type: 24mm  NO pain with pumping     Maternal ROS:  Constitutional: No fever, chills. Feeling well  Breasts: Soreness of breasts and Soreness of nipples has resolved  Psychiatric: Managing ok  Mental Health: No mention of feeling irritable, agitated, angry, overwhelmed, apathy, exhaustion nor having sleep changes outside infant feeds/demands or appetite changes       Objective:     Maternal Physical   General: No acute distress  Breasts: Symetrical , Full, Plugged Duct - no evidence and Mastitis  - no S/S  Nipples: intact  Psychiatric:  Normal mood and affect. Her behavior is normal. Judgment and thought content normal   Mental Health:  Did NOT exhibit sadness, crying, feeling overwhelmed, agitation or hypervigilance.     Assessment/Plan & Lactation Counseling:     Infant exam on infant chart    Infant Weight History:   1/2/22         5#12.6oz  1/5/22         5#9oz Ped office  1/7/2022     5#10.2oz  1/14/2022    6#5.0oz    Infant intake at Breast:: Left 12ml    Right 12ml    Total: 24ml  (Prior feedings at home measured and baby took 60ml and showed good weight gain)  Milk Transfer at this feeding:   Effective breastfeeding  Pumped:  Not indicated  Initiation of Feeding: Infant initiates and Needed to be roused but not really interested in more than a snack  Position of Feeding:    Right: football  Left: cross cradle  Attachment Achieved: Bare breast without difficulty  but not sustained, improved with NS  Nipple shield:     Size: 24mm       Introduced 1/7/22 today  Latch achieved yes  Suck Pattern at the breast: Suck burst and normal rest  Suck Pattern on the bottle:N/A  Behavior Following Observed Feeding: sleeping  Nipple Pain: resolved    Latch: Mom latches independently, Assisted latch and Latch difficulty without nipple shield  Suckling/Feeding: attaches, audible swallows, baby fed effectively, baby roots, elicits FRANCES, frequent pauses and intermittent swallows  Milk Supply Available: abundant with saved milk, managing well not uncomfortable    Maternal Diagnosis/Problem:  Lactation problem - difficulty at the breast requiring NS    BREASTFEEDING PLAN  Discussed concerns and symptoms as listed above in assessment and guidance summarized below.  Shared decision making was used between myself and the family for this encounter, home care, and follow up.  Topics reviewed included:    •  Feeding:   o Offer breast two feedings then one feeding bottle  o Nighttime bottle only until you find breastfeeding is much easier than pumping  o [place] other side at night if only feeding one side  o Feed your baby every 1.5-3 hours in the day, more often if baby acts hungry.   o Awaken baby for feeding if going over 3 hours in the day. .    o Need to get in 8-12 feedings per 24 hours. Getting about 9-10  o  Given infants weight you may allow baby to go longer at night,  but that generally means shorter durations in the day.    •  Supplement:   • Supplement with expressed milk    o  When bottle-feeding, consider paced feeding   o Pacing the feeding:  - A slow flow nipple helps, but how you feed the baby is more important.  Good positioning can compensate for a faster flow nipple.  When bottle-feeding, the baby should control how much is consumed at a feeding.  Holding the baby in an upright position with the bottle horizontal ensures that the baby gets milk only when sucking.  Here is a nice  "video demonstrating this concept of paced bottle feeding,  https://www.youtube.com/watch?v=LoJBV7qUI8F    •  Nipple shield (NS): We prefer the 24mm Medela.   o Before applying, roll the shield in on itself (like a sombrero, and allow breast to be pulled  in to the shield tip.   o The latch is very different from the bare breast, bring the baby to you and let them find the nipple shield and work it out.   o Once you and your baby are familiar with the NS, you may be able to just put it on the breast and latch the baby without any preparation.    • Positioning Techniques for bare breast  o Suggested positions Cross cradle, Football and Laid Back  o Fine tune position by making sure your fingers beneath the breast as well as your bra, are out of the way of your baby's chin.  o Positioning:  Many positions shown, great sidelying at 7 minutes.   o See [email or web address]-[email or web address]-[email or web address]-[email or web address]/?[email or web address]    •  Latch on Techniques for bare breast.   o Modify for nipple shield use soon enough you will move back to bare breast.  o Fine tune latch:  - By holding your baby more securely at the breast, assisting your baby to stay attached by:  • Bringing your baby to your breast, not breast to the baby  • Your baby's cheek to touch breast securely, nose tipped back  • Hold your baby firmly in place so when your baby forgets to suck and picks it back up again your baby is in the correct spot. You will be extinguishing behavior and replacing it with a deeper latch to stimulate suck and provide satisfaction at the breast.  • Your baby needs as much breast as deep in the mouth as possible to allow your nipples to heal and for you more importantly to maximize efficiency at the breast  o Latch is asymmetrical, leading with the chin, getting the baby below the breast, as if offering a large \"deli severiano sandwich\".    •  Pumping Guidelines:  o Both breasts   o Pump 4-6 times in 24 hours, skipping a " pumping after a good breastfeeding  o Type of pump:  -  Medela  • Always double pump  o How long will vary woman to woman, typically 8-15 minutes, or 1-2 minutes after flow slows  o Flange Fit: Freely moving nipple in the tunnel with some movement of the areola.    • Storage (Acceptable guidelines for healthy term babies)  o 10 hours at room temp including your pieces, may rinse but not mandatory  o 8 days refrigerator, don't need  to refrigerate right away if using fresh pumped milk at the next feeding  o Freezer 1 year  o Deep freezer 2 years  o American Academy or Pediatrics has said you may mix different temperature milks.   o If baby drinks breastmilk from a fresh or refrigerated bottle of breastmilk,  you may return the unused portion to the refrigerator and use once at next feeding.     •  Connect with other mothers:  o [place]:   - [place] [facility]: [email or web address]  - Well-Nourished Babies (Private group for questions and support): [email or web address] [email or web address]  o [facility] [place] [facility]  - Tuesdays 10am - 11am. Women[facility] at [address], 3rd floor conference room  - Come and check your baby's weight, do a feeding and see how your baby is growing, visit with other mothers, plan on a walk or coffee date after group.  • Please wear a mask  • Due to space limitations - no strollers please (Fresh c/section moms should use the stroller)  • We would love to have dads stay, but moms won't breastfeed.  • The room is only available from 10am -11am, there is a meeting prior and after.   • All diapers must be taken with you   o [facility] [place] [facility]  - This is an emotional, informational and safe support [employer] with your like-minded community that builds solidarity among moms  - The honest experiences of mothers are highly valued among the other mothers  - Tuesday  at 11am by Zoom,  you will be sent an invitation each week, please unsubscribe as  you wish  - You may instead copy and paste this link: [email or web address]    - Passcode  [id number]  - You may share this link with friends; please don't post on social media.    Follow up     Mom is encouraged to e-mail to update how the plan is working.    Pediatrician appointment: 1/19/22    Follow-up for infant weight check and dyad breastfeeding evaluation as needed  Please call [phone] if you have not scheduled your next appointment    A total of 75 minutes, not including infant assessment time, with more than 50% was spent preparing to see the patient, obtaining and reviewing separately obtained history, performing a medically appropriate examination and evaluation, counseling and educating the family,  documenting clinical information in the electronic health record, independently interpreting weighted feeds and infant growth results, communicating these results to the family and care coordination as detailed in the above note.       GEORGIA Dhillon.

## 2022-03-03 ENCOUNTER — OFFICE VISIT (OUTPATIENT)
Dept: URGENT CARE | Facility: CLINIC | Age: 27
End: 2022-03-03
Payer: COMMERCIAL

## 2022-03-03 VITALS
TEMPERATURE: 97 F | BODY MASS INDEX: 24.8 KG/M2 | DIASTOLIC BLOOD PRESSURE: 66 MMHG | SYSTOLIC BLOOD PRESSURE: 106 MMHG | WEIGHT: 140 LBS | RESPIRATION RATE: 16 BRPM | HEART RATE: 84 BPM | HEIGHT: 63 IN | OXYGEN SATURATION: 97 %

## 2022-03-03 PROCEDURE — 99213 OFFICE O/P EST LOW 20 MIN: CPT | Performed by: NURSE PRACTITIONER

## 2022-03-03 RX ORDER — CEPHALEXIN 500 MG/1
500 CAPSULE ORAL 4 TIMES DAILY
Qty: 20 CAPSULE | Refills: 0 | Status: SHIPPED | OUTPATIENT
Start: 2022-03-03 | End: 2022-03-08

## 2022-03-03 ASSESSMENT — ENCOUNTER SYMPTOMS
MYALGIAS: 1
NAUSEA: 0
VOMITING: 0
CHILLS: 1
HEADACHES: 0
DIARRHEA: 0
ABDOMINAL PAIN: 0
FEVER: 0

## 2022-03-03 ASSESSMENT — FIBROSIS 4 INDEX: FIB4 SCORE: 0.55

## 2022-03-03 NOTE — PROGRESS NOTES
Subjective:     Maria E Cisneros is a 26 y.o. female who presents for Breast Pain (X 2 days, breast pain, body aches, light headed, hot to touch, body chills.)      HPI  Pt presents for evaluation of a new problem. Maria E is a pleasant 26-year-old female presents to urgent care today with complaints of left-sided breast pain for the past 2 days.  She is currently breast-feeding and is 2 months postpartum.  Her pain is rated as moderate.  She notes increased pain with let down of her milk.  She notes associated symptoms including body aches and chills.  She denies any documented fever.     Review of Systems   Constitutional: Positive for chills and malaise/fatigue. Negative for fever.   Gastrointestinal: Negative for abdominal pain, diarrhea, nausea and vomiting.   Musculoskeletal: Positive for myalgias.   Neurological: Negative for headaches.       PMH:   Past Medical History:   Diagnosis Date   • Allergy     nasal congestion, chronic   • Asthma     exercise induced asthma   • Depression      ALLERGIES:   Allergies   Allergen Reactions   • Codeine Vomiting   • Vicodin [Apap-Fd&C Yellow #10 [place]-Hydrocodone] Vomiting     SURGHX:   Past Surgical History:   Procedure Laterality Date   • OTHER ORTHOPEDIC SURGERY  2010    mensicus torn, repaired   • OTHER      Tonsil removal     SOCHX:   Social History     Socioeconomic History   • Marital status: Single   Tobacco Use   • Smoking status: Never Smoker   • Smokeless tobacco: Never Used   Vaping Use   • Vaping Use: Never used   Substance and Sexual Activity   • Alcohol use: Not Currently   • Drug use: No   • Sexual activity: Yes     Partners: Male     Comment:     Social History Narrative    ** Merged History Encounter **          FH:   Family History   Problem Relation Age of Onset   • Cancer Mother         breaset cancer, 40's   • Diabetes Father         pre   • Hyperlipidemia Father    • Other Brother         NF, affects brain   • No Known Problems  "Maternal Grandmother    • No Known Problems Maternal Grandfather    • No Known Problems Paternal Grandmother    • No Known Problems Paternal Grandfather          Objective:   /66   Pulse 84   Temp 36.1 °C (97 °F) (Temporal)   Resp 16   Ht 1.6 m (5' 3\")   Wt 63.5 kg (140 lb)   LMP 04/07/2021 (Approximate)   SpO2 97%   Breastfeeding Yes   BMI 24.80 kg/m²     Physical Exam  Vitals and nursing note reviewed.   Constitutional:       General: She is not in acute distress.     Appearance: Normal appearance. She is normal weight. She is not ill-appearing.   HENT:      Head: Normocephalic and atraumatic.      Right Ear: External ear normal.      Left Ear: External ear normal.      Nose: No congestion or rhinorrhea.      Mouth/Throat:      Mouth: Mucous membranes are moist.   Eyes:      Extraocular Movements: Extraocular movements intact.      Pupils: Pupils are equal, round, and reactive to light.   Cardiovascular:      Rate and Rhythm: Normal rate and regular rhythm.      Pulses: Normal pulses.      Heart sounds: Normal heart sounds.   Pulmonary:      Effort: Pulmonary effort is normal.      Breath sounds: Normal breath sounds.   Chest:   Breasts:      Left: Tenderness present. No swelling, bleeding, inverted nipple, mass or nipple discharge.        Comments: Erythema to left upper breast directly above the nipple.  Positive for pain with palpation.  Abdominal:      General: Abdomen is flat. Bowel sounds are normal.      Palpations: Abdomen is soft.      Tenderness: There is no abdominal tenderness. There is no right CVA tenderness or left CVA tenderness.   Musculoskeletal:         General: Normal range of motion.      Cervical back: Normal range of motion and neck supple.   Skin:     General: Skin is warm and dry.      Capillary Refill: Capillary refill takes less than 2 seconds.   Neurological:      General: No focal deficit present.      Mental Status: She is alert and oriented to person, place, and time. " Mental status is at baseline.   Psychiatric:         Mood and Affect: Mood normal.         Behavior: Behavior normal.         Thought Content: Thought content normal.         Judgment: Judgment normal.         Assessment/Plan:   Assessment    1. Mastitis, obstetric, postpartum condition  cephALEXin (KEFLEX) 500 MG Cap   Maria E will be treated for mastitis of the left breast secondary to breast-feeding.  She was encouraged to continue using hot compresses, Tylenol and ibuprofen as needed for pain.  Follow-up for worsening or persistent symptoms.    AVS handout given and reviewed with patient. Pt educated on red flags and when to seek treatment back in ER or UC.

## 2022-03-03 NOTE — PATIENT INSTRUCTIONS
"Breastfeeding and Human Lactation (4th ed., pp. 262-299). ANNI Herron: [employer].\">   Breastfeeding and Mastitis    Mastitis is inflammation of the breast tissue. It can occur in women who are breastfeeding. This can make breastfeeding painful. Mastitis will sometimes go away on its own, especially if it is not caused by an infection (non-infectious mastitis). Your health care provider will help determine if medical treatment is needed. Treatment may be needed if the condition is caused by a bacterial infection (infectious mastitis).  What are the causes?  This condition is often associated with a blocked milkduct, which can happen when too much milk builds up in the breast. Causes of excess milk in the breast can include:  · Poor latch-on. If your baby is not latched onto the breast properly, he or she may not empty your breast completely while breastfeeding.  · Allowing too much time to pass between feedings.  · Wearing a bra or other clothing that is too tight. This puts extra pressure on the milk ducts so milk does not flow through them as it should.  · Milk remaining in the breast because it is overfilled (engorged).  · Stress and fatigue.  Mastitis can also be caused by a bacterial infection. Bacteria may enter the breast tissue through cuts, cracks, or openings in the skin near the nipple area. Cracks in the skin are often caused when your baby does not latch on properly to the breast.  What are the signs or symptoms?  Symptoms of this condition include:  · Swelling, redness, tenderness, and pain in an area of the breast. This usually affects the upper part of the breast, toward the armpit region. In most cases, it affects only one breast. In some cases, it may occur on both breasts at the same time and affect a larger portion of breast tissue.  · Swelling of the glands under the arm on the same side.  · Fatigue, headache, and flu-like muscle aches.  · Fever.  · Rapid pulse.  Symptoms " usually last 2 to 5 days. Breast pain and redness are at their worst on day 2 and day 3, and they usually go away by day 5. If an infection is left to progress, a collection of pus (abscess) may develop.  How is this diagnosed?  This condition can be diagnosed based on your symptoms and a physical exam. You may also have tests, such as:  · Blood tests to determine if your body is fighting a bacterial infection.  · Mammogram or ultrasound tests to rule out other problems or diseases.  · Fluid tests. If an abscess has developed, the fluid in the abscess may be removed with a needle. The fluid may be analyzed to determine if bacteria are present.  · Breast milk may be cultured and tested for bacteria.  How is this treated?  This condition will sometimes go away on its own. Your health care provider may choose to wait 24 hours after first seeing you to decide whether treatment is needed. If treatment is needed, it may include:  · Strategies to manage breastfeeding. This includes continuing to breastfeed or pump in order to allow adequate milk flow, using breast massage, and applying heat or cold to the affected area.  · Self-care such as rest and increased fluid intake.  · Medicine for pain.  · Antibiotic medicine to treat a bacterial infection. This is usually taken by mouth.  · If an abscess has developed, it may be treated by removing fluid with a needle.  Follow these instructions at home:  Medicines  · Take over-the-counter and prescription medicines only as told by your health care provider.  · If you were prescribed an antibiotic medicine, take it as told by your health care provider. Do not stop taking the antibiotic even if you start to feel better.  General instructions  · Do not wear a tight or underwire bra. Wear a soft, supportive bra.  · Increase your fluid intake, especially if you have a fever.  · Get plenty of rest.  For breastfeeding:  · Continue to empty your breasts as often as possible, either by  breastfeeding or using an electric breast pump. This will lower the pressure and the pain that comes with it. Ask your health care provider if changes need to be made to your breastfeeding or pumping routine.  · Keep your nipples clean and dry.  · During breastfeeding, empty the first breast completely before going to the other breast. If your baby is not emptying your breasts completely, use a breast pump to empty your breasts.  · Use breast massage during feeding or pumping sessions.  · If directed, apply moist heat to the affected area of your breast right before breastfeeding or pumping. Use the heat source that your health care provider recommends.  · If directed, put ice on the affected area of your breast right after breastfeeding or pumping:  ? Put ice in a plastic bag.  ? Place a towel between your skin and the bag.  ? Leave the ice on for 20 minutes.  · If you go back to work, pump your breasts while at work to stay in time with your nursing schedule.  · Do not allow your breasts to become engorged.  Contact a health care provider if:  · You have pus-like discharge from the breast.  · You have a fever.  · Your symptoms do not improve within 2 days of starting treatment.  · Your symptoms return after you have recovered from a breast infection.  Get help right away if:  · Your pain and swelling are getting worse.  · You have pain that is not controlled with medicine.  · You have a red line extending from the breast toward your armpit.  Summary  · Mastitis is inflammation of the breast tissue. It is often caused by a blocked milk duct or bacteria.  · This condition may be treated with hot and cold compresses, medicines, self-care, and certain breastfeeding strategies.  · If you were prescribed an antibiotic medicine, take it as told by your health care provider. Do not stop taking the antibiotic even if you start to feel better.  · Continue to empty your breasts as often as possible either by breastfeeding or  using an electric breast pump.  This information is not intended to replace advice given to you by your health care provider. Make sure you discuss any questions you have with your health care provider.  Document Released: 04/14/2006 Document Revised: 09/06/2019 Document Reviewed: 12/19/2017  Elsevier Patient Education © 2020 [employer]

## 2022-03-16 ENCOUNTER — SUPERVISING PHYSICIAN REVIEW (OUTPATIENT)
Dept: URGENT CARE | Facility: CLINIC | Age: 27
End: 2022-03-16
Payer: COMMERCIAL

## 2022-05-06 ENCOUNTER — PRE-ADMISSION TESTING (OUTPATIENT)
Dept: ADMISSIONS | Facility: MEDICAL CENTER | Age: 27
End: 2022-05-06
Attending: OBSTETRICS & GYNECOLOGY
Payer: COMMERCIAL

## 2022-05-06 DIAGNOSIS — Z01.812 PRE-OPERATIVE LABORATORY EXAMINATION: ICD-10-CM

## 2022-05-09 ENCOUNTER — PRE-ADMISSION TESTING (OUTPATIENT)
Dept: ADMISSIONS | Facility: MEDICAL CENTER | Age: 27
End: 2022-05-09
Attending: OBSTETRICS & GYNECOLOGY
Payer: COMMERCIAL

## 2022-05-09 DIAGNOSIS — Z01.812 PRE-OPERATIVE LABORATORY EXAMINATION: ICD-10-CM

## 2022-05-09 LAB
ABO GROUP BLD: NORMAL
ALBUMIN SERPL BCP-MCNC: 4.5 G/DL (ref 3.2–4.9)
ALBUMIN/GLOB SERPL: 2.1 G/DL
ALP SERPL-CCNC: 75 U/L (ref 30–99)
ALT SERPL-CCNC: 15 U/L (ref 2–50)
ANION GAP SERPL CALC-SCNC: 11 MMOL/L (ref 7–16)
APPEARANCE UR: CLEAR
AST SERPL-CCNC: 10 U/L (ref 12–45)
B-HCG SERPL-ACNC: <1 MIU/ML (ref 0–5)
BACTERIA #/AREA URNS HPF: NEGATIVE /HPF
BASOPHILS # BLD AUTO: 0.8 % (ref 0–1.8)
BASOPHILS # BLD: 0.05 K/UL (ref 0–0.12)
BILIRUB SERPL-MCNC: 0.6 MG/DL (ref 0.1–1.5)
BILIRUB UR QL STRIP.AUTO: NEGATIVE
BLD GP AB SCN SERPL QL: NORMAL
BUN SERPL-MCNC: 16 MG/DL (ref 8–22)
CALCIUM SERPL-MCNC: 9.3 MG/DL (ref 8.5–10.5)
CHLORIDE SERPL-SCNC: 105 MMOL/L (ref 96–112)
CO2 SERPL-SCNC: 23 MMOL/L (ref 20–33)
COLOR UR: YELLOW
CREAT SERPL-MCNC: 0.84 MG/DL (ref 0.5–1.4)
EOSINOPHIL # BLD AUTO: 0.63 K/UL (ref 0–0.51)
EOSINOPHIL NFR BLD: 10.2 % (ref 0–6.9)
EPI CELLS #/AREA URNS HPF: ABNORMAL /HPF
ERYTHROCYTE [DISTWIDTH] IN BLOOD BY AUTOMATED COUNT: 41.3 FL (ref 35.9–50)
GFR SERPLBLD CREATININE-BSD FMLA CKD-EPI: 97 ML/MIN/1.73 M 2
GLOBULIN SER CALC-MCNC: 2.1 G/DL (ref 1.9–3.5)
GLUCOSE SERPL-MCNC: 86 MG/DL (ref 65–99)
GLUCOSE UR STRIP.AUTO-MCNC: NEGATIVE MG/DL
HCT VFR BLD AUTO: 41.9 % (ref 37–47)
HGB BLD-MCNC: 14.1 G/DL (ref 12–16)
HYALINE CASTS #/AREA URNS LPF: ABNORMAL /LPF
IMM GRANULOCYTES # BLD AUTO: 0.01 K/UL (ref 0–0.11)
IMM GRANULOCYTES NFR BLD AUTO: 0.2 % (ref 0–0.9)
KETONES UR STRIP.AUTO-MCNC: NEGATIVE MG/DL
LEUKOCYTE ESTERASE UR QL STRIP.AUTO: NEGATIVE
LYMPHOCYTES # BLD AUTO: 2.19 K/UL (ref 1–4.8)
LYMPHOCYTES NFR BLD: 35.5 % (ref 22–41)
MCH RBC QN AUTO: 29.6 PG (ref 27–33)
MCHC RBC AUTO-ENTMCNC: 33.7 G/DL (ref 33.6–35)
MCV RBC AUTO: 88 FL (ref 81.4–97.8)
MICRO URNS: ABNORMAL
MONOCYTES # BLD AUTO: 0.35 K/UL (ref 0–0.85)
MONOCYTES NFR BLD AUTO: 5.7 % (ref 0–13.4)
NEUTROPHILS # BLD AUTO: 2.94 K/UL (ref 2–7.15)
NEUTROPHILS NFR BLD: 47.6 % (ref 44–72)
NITRITE UR QL STRIP.AUTO: NEGATIVE
NRBC # BLD AUTO: 0 K/UL
NRBC BLD-RTO: 0 /100 WBC
PH UR STRIP.AUTO: 6 [PH] (ref 5–8)
PLATELET # BLD AUTO: 234 K/UL (ref 164–446)
PMV BLD AUTO: 11.2 FL (ref 9–12.9)
POTASSIUM SERPL-SCNC: 4.3 MMOL/L (ref 3.6–5.5)
PROT SERPL-MCNC: 6.6 G/DL (ref 6–8.2)
PROT UR QL STRIP: NEGATIVE MG/DL
RBC # BLD AUTO: 4.76 M/UL (ref 4.2–5.4)
RBC # URNS HPF: ABNORMAL /HPF
RBC UR QL AUTO: ABNORMAL
RH BLD: NORMAL
SODIUM SERPL-SCNC: 139 MMOL/L (ref 135–145)
SP GR UR STRIP.AUTO: >=1.03
UROBILINOGEN UR STRIP.AUTO-MCNC: 0.2 MG/DL
WBC # BLD AUTO: 6.2 K/UL (ref 4.8–10.8)
WBC #/AREA URNS HPF: ABNORMAL /HPF

## 2022-05-09 PROCEDURE — 86850 RBC ANTIBODY SCREEN: CPT

## 2022-05-09 PROCEDURE — 80053 COMPREHEN METABOLIC PANEL: CPT

## 2022-05-09 PROCEDURE — 36415 COLL VENOUS BLD VENIPUNCTURE: CPT

## 2022-05-09 PROCEDURE — 86901 BLOOD TYPING SEROLOGIC RH(D): CPT

## 2022-05-09 PROCEDURE — 85025 COMPLETE CBC W/AUTO DIFF WBC: CPT

## 2022-05-09 PROCEDURE — 86900 BLOOD TYPING SEROLOGIC ABO: CPT

## 2022-05-09 PROCEDURE — 81001 URINALYSIS AUTO W/SCOPE: CPT

## 2022-05-09 PROCEDURE — 84702 CHORIONIC GONADOTROPIN TEST: CPT

## 2022-05-12 ENCOUNTER — HOSPITAL ENCOUNTER (OUTPATIENT)
Facility: MEDICAL CENTER | Age: 27
End: 2022-05-12
Attending: OBSTETRICS & GYNECOLOGY | Admitting: OBSTETRICS & GYNECOLOGY
Payer: COMMERCIAL

## 2022-05-12 ENCOUNTER — ANESTHESIA EVENT (OUTPATIENT)
Dept: SURGERY | Facility: MEDICAL CENTER | Age: 27
End: 2022-05-12
Payer: COMMERCIAL

## 2022-05-12 ENCOUNTER — ANESTHESIA (OUTPATIENT)
Dept: SURGERY | Facility: MEDICAL CENTER | Age: 27
End: 2022-05-12
Payer: COMMERCIAL

## 2022-05-12 VITALS
HEIGHT: 63 IN | OXYGEN SATURATION: 99 % | BODY MASS INDEX: 22.7 KG/M2 | RESPIRATION RATE: 16 BRPM | WEIGHT: 128.09 LBS | SYSTOLIC BLOOD PRESSURE: 107 MMHG | HEART RATE: 86 BPM | DIASTOLIC BLOOD PRESSURE: 55 MMHG | TEMPERATURE: 97.1 F

## 2022-05-12 LAB
ABO + RH BLD: NORMAL
HCG UR QL: NEGATIVE
PATHOLOGY CONSULT NOTE: NORMAL

## 2022-05-12 PROCEDURE — 501838 HCHG SUTURE GENERAL: Performed by: OBSTETRICS & GYNECOLOGY

## 2022-05-12 PROCEDURE — 88307 TISSUE EXAM BY PATHOLOGIST: CPT

## 2022-05-12 PROCEDURE — 502704 HCHG DEVICE, LIGASURE IMPACT: Performed by: OBSTETRICS & GYNECOLOGY

## 2022-05-12 PROCEDURE — 700102 HCHG RX REV CODE 250 W/ 637 OVERRIDE(OP): Performed by: ANESTHESIOLOGY

## 2022-05-12 PROCEDURE — 160025 RECOVERY II MINUTES (STATS): Performed by: OBSTETRICS & GYNECOLOGY

## 2022-05-12 PROCEDURE — 501581 HCHG TROCAR: Performed by: OBSTETRICS & GYNECOLOGY

## 2022-05-12 PROCEDURE — 160002 HCHG RECOVERY MINUTES (STAT): Performed by: OBSTETRICS & GYNECOLOGY

## 2022-05-12 PROCEDURE — 502703 HCHG DEVICE, LIGASURE V SEALER: Performed by: OBSTETRICS & GYNECOLOGY

## 2022-05-12 PROCEDURE — 700101 HCHG RX REV CODE 250: Performed by: ANESTHESIOLOGY

## 2022-05-12 PROCEDURE — 160046 HCHG PACU - 1ST 60 MINS PHASE II: Performed by: OBSTETRICS & GYNECOLOGY

## 2022-05-12 PROCEDURE — 700111 HCHG RX REV CODE 636 W/ 250 OVERRIDE (IP): Performed by: ANESTHESIOLOGY

## 2022-05-12 PROCEDURE — 700105 HCHG RX REV CODE 258: Performed by: OBSTETRICS & GYNECOLOGY

## 2022-05-12 PROCEDURE — 501570 HCHG TROCAR, SEPARATOR: Performed by: OBSTETRICS & GYNECOLOGY

## 2022-05-12 PROCEDURE — 36415 COLL VENOUS BLD VENIPUNCTURE: CPT

## 2022-05-12 PROCEDURE — 160009 HCHG ANES TIME/MIN: Performed by: OBSTETRICS & GYNECOLOGY

## 2022-05-12 PROCEDURE — 00840 ANES IPER PX LOWER ABD NOS: CPT | Performed by: ANESTHESIOLOGY

## 2022-05-12 PROCEDURE — 500002 HCHG ADHESIVE, DERMABOND: Performed by: OBSTETRICS & GYNECOLOGY

## 2022-05-12 PROCEDURE — 81025 URINE PREGNANCY TEST: CPT

## 2022-05-12 PROCEDURE — 88341 IMHCHEM/IMCYTCHM EA ADD ANTB: CPT

## 2022-05-12 PROCEDURE — 160029 HCHG SURGERY MINUTES - 1ST 30 MINS LEVEL 4: Performed by: OBSTETRICS & GYNECOLOGY

## 2022-05-12 PROCEDURE — 501582 HCHG TROCAR, THRD BLADED: Performed by: OBSTETRICS & GYNECOLOGY

## 2022-05-12 PROCEDURE — 700105 HCHG RX REV CODE 258: Performed by: ANESTHESIOLOGY

## 2022-05-12 PROCEDURE — 160041 HCHG SURGERY MINUTES - EA ADDL 1 MIN LEVEL 4: Performed by: OBSTETRICS & GYNECOLOGY

## 2022-05-12 PROCEDURE — 700101 HCHG RX REV CODE 250: Performed by: OBSTETRICS & GYNECOLOGY

## 2022-05-12 PROCEDURE — 160047 HCHG PACU  - EA ADDL 30 MINS PHASE II: Performed by: OBSTETRICS & GYNECOLOGY

## 2022-05-12 PROCEDURE — 500886 HCHG PACK, LAPAROSCOPY: Performed by: OBSTETRICS & GYNECOLOGY

## 2022-05-12 PROCEDURE — 160035 HCHG PACU - 1ST 60 MINS PHASE I: Performed by: OBSTETRICS & GYNECOLOGY

## 2022-05-12 PROCEDURE — 88342 IMHCHEM/IMCYTCHM 1ST ANTB: CPT

## 2022-05-12 PROCEDURE — 160048 HCHG OR STATISTICAL LEVEL 1-5: Performed by: OBSTETRICS & GYNECOLOGY

## 2022-05-12 PROCEDURE — A9270 NON-COVERED ITEM OR SERVICE: HCPCS | Performed by: ANESTHESIOLOGY

## 2022-05-12 RX ORDER — CEFAZOLIN SODIUM 1 G/3ML
INJECTION, POWDER, FOR SOLUTION INTRAMUSCULAR; INTRAVENOUS PRN
Status: DISCONTINUED | OUTPATIENT
Start: 2022-05-12 | End: 2022-05-12 | Stop reason: SURG

## 2022-05-12 RX ORDER — HALOPERIDOL 5 MG/ML
1 INJECTION INTRAMUSCULAR
Status: DISCONTINUED | OUTPATIENT
Start: 2022-05-12 | End: 2022-05-12 | Stop reason: HOSPADM

## 2022-05-12 RX ORDER — SODIUM CHLORIDE, SODIUM LACTATE, POTASSIUM CHLORIDE, CALCIUM CHLORIDE 600; 310; 30; 20 MG/100ML; MG/100ML; MG/100ML; MG/100ML
INJECTION, SOLUTION INTRAVENOUS
Status: DISCONTINUED | OUTPATIENT
Start: 2022-05-12 | End: 2022-05-12 | Stop reason: SURG

## 2022-05-12 RX ORDER — OXYCODONE HCL 5 MG/5 ML
10 SOLUTION, ORAL ORAL
Status: COMPLETED | OUTPATIENT
Start: 2022-05-12 | End: 2022-05-12

## 2022-05-12 RX ORDER — MIDAZOLAM HYDROCHLORIDE 1 MG/ML
INJECTION INTRAMUSCULAR; INTRAVENOUS PRN
Status: DISCONTINUED | OUTPATIENT
Start: 2022-05-12 | End: 2022-05-12 | Stop reason: SURG

## 2022-05-12 RX ORDER — CELECOXIB 200 MG/1
200 CAPSULE ORAL ONCE
Status: COMPLETED | OUTPATIENT
Start: 2022-05-12 | End: 2022-05-12

## 2022-05-12 RX ORDER — LIDOCAINE HYDROCHLORIDE 20 MG/ML
INJECTION, SOLUTION EPIDURAL; INFILTRATION; INTRACAUDAL; PERINEURAL PRN
Status: DISCONTINUED | OUTPATIENT
Start: 2022-05-12 | End: 2022-05-12 | Stop reason: SURG

## 2022-05-12 RX ORDER — ROCURONIUM BROMIDE 10 MG/ML
INJECTION, SOLUTION INTRAVENOUS PRN
Status: DISCONTINUED | OUTPATIENT
Start: 2022-05-12 | End: 2022-05-12 | Stop reason: SURG

## 2022-05-12 RX ORDER — HYDROMORPHONE HYDROCHLORIDE 1 MG/ML
0.2 INJECTION, SOLUTION INTRAMUSCULAR; INTRAVENOUS; SUBCUTANEOUS
Status: DISCONTINUED | OUTPATIENT
Start: 2022-05-12 | End: 2022-05-12 | Stop reason: HOSPADM

## 2022-05-12 RX ORDER — ACETAMINOPHEN 500 MG
1000 TABLET ORAL ONCE
Status: COMPLETED | OUTPATIENT
Start: 2022-05-12 | End: 2022-05-12

## 2022-05-12 RX ORDER — ONDANSETRON 2 MG/ML
INJECTION INTRAMUSCULAR; INTRAVENOUS PRN
Status: DISCONTINUED | OUTPATIENT
Start: 2022-05-12 | End: 2022-05-12 | Stop reason: SURG

## 2022-05-12 RX ORDER — BUPIVACAINE HYDROCHLORIDE AND EPINEPHRINE 2.5; 5 MG/ML; UG/ML
INJECTION, SOLUTION EPIDURAL; INFILTRATION; INTRACAUDAL; PERINEURAL
Status: DISCONTINUED | OUTPATIENT
Start: 2022-05-12 | End: 2022-05-12 | Stop reason: HOSPADM

## 2022-05-12 RX ORDER — MEPERIDINE HYDROCHLORIDE 25 MG/ML
6.25 INJECTION INTRAMUSCULAR; INTRAVENOUS; SUBCUTANEOUS
Status: DISCONTINUED | OUTPATIENT
Start: 2022-05-12 | End: 2022-05-12 | Stop reason: HOSPADM

## 2022-05-12 RX ORDER — DIPHENHYDRAMINE HYDROCHLORIDE 50 MG/ML
12.5 INJECTION INTRAMUSCULAR; INTRAVENOUS
Status: DISCONTINUED | OUTPATIENT
Start: 2022-05-12 | End: 2022-05-12 | Stop reason: HOSPADM

## 2022-05-12 RX ORDER — SODIUM CHLORIDE, SODIUM LACTATE, POTASSIUM CHLORIDE, CALCIUM CHLORIDE 600; 310; 30; 20 MG/100ML; MG/100ML; MG/100ML; MG/100ML
INJECTION, SOLUTION INTRAVENOUS CONTINUOUS
Status: ACTIVE | OUTPATIENT
Start: 2022-05-12 | End: 2022-05-12

## 2022-05-12 RX ORDER — EPINEPHRINE 1 MG/ML(1)
AMPUL (ML) INJECTION
Status: DISCONTINUED
Start: 2022-05-12 | End: 2022-05-12 | Stop reason: HOSPADM

## 2022-05-12 RX ORDER — ONDANSETRON 2 MG/ML
4 INJECTION INTRAMUSCULAR; INTRAVENOUS
Status: DISCONTINUED | OUTPATIENT
Start: 2022-05-12 | End: 2022-05-12 | Stop reason: HOSPADM

## 2022-05-12 RX ORDER — OXYCODONE HCL 5 MG/5 ML
5 SOLUTION, ORAL ORAL
Status: COMPLETED | OUTPATIENT
Start: 2022-05-12 | End: 2022-05-12

## 2022-05-12 RX ORDER — BUPIVACAINE HYDROCHLORIDE 2.5 MG/ML
INJECTION, SOLUTION EPIDURAL; INFILTRATION; INTRACAUDAL
Status: DISCONTINUED
Start: 2022-05-12 | End: 2022-05-12 | Stop reason: HOSPADM

## 2022-05-12 RX ORDER — PROMETHAZINE HYDROCHLORIDE 25 MG/1
12.5 SUPPOSITORY RECTAL EVERY 4 HOURS PRN
Status: DISCONTINUED | OUTPATIENT
Start: 2022-05-12 | End: 2022-05-12 | Stop reason: HOSPADM

## 2022-05-12 RX ORDER — HYDROMORPHONE HYDROCHLORIDE 1 MG/ML
0.4 INJECTION, SOLUTION INTRAMUSCULAR; INTRAVENOUS; SUBCUTANEOUS
Status: DISCONTINUED | OUTPATIENT
Start: 2022-05-12 | End: 2022-05-12 | Stop reason: HOSPADM

## 2022-05-12 RX ORDER — HYDROMORPHONE HYDROCHLORIDE 1 MG/ML
0.1 INJECTION, SOLUTION INTRAMUSCULAR; INTRAVENOUS; SUBCUTANEOUS
Status: DISCONTINUED | OUTPATIENT
Start: 2022-05-12 | End: 2022-05-12 | Stop reason: HOSPADM

## 2022-05-12 RX ADMIN — SODIUM CHLORIDE, POTASSIUM CHLORIDE, SODIUM LACTATE AND CALCIUM CHLORIDE: 600; 310; 30; 20 INJECTION, SOLUTION INTRAVENOUS at 13:46

## 2022-05-12 RX ADMIN — OXYCODONE HYDROCHLORIDE 5 MG: 5 SOLUTION ORAL at 16:09

## 2022-05-12 RX ADMIN — ACETAMINOPHEN 1000 MG: 500 TABLET ORAL at 13:55

## 2022-05-12 RX ADMIN — SUGAMMADEX 200 MG: 100 INJECTION, SOLUTION INTRAVENOUS at 15:37

## 2022-05-12 RX ADMIN — FENTANYL CITRATE 50 MCG: 50 INJECTION, SOLUTION INTRAMUSCULAR; INTRAVENOUS at 15:22

## 2022-05-12 RX ADMIN — CEFAZOLIN 2 G: 330 INJECTION, POWDER, FOR SOLUTION INTRAMUSCULAR; INTRAVENOUS at 14:34

## 2022-05-12 RX ADMIN — ROCURONIUM BROMIDE 10 MG: 10 INJECTION, SOLUTION INTRAVENOUS at 14:54

## 2022-05-12 RX ADMIN — SODIUM CHLORIDE, POTASSIUM CHLORIDE, SODIUM LACTATE AND CALCIUM CHLORIDE: 600; 310; 30; 20 INJECTION, SOLUTION INTRAVENOUS at 14:23

## 2022-05-12 RX ADMIN — LIDOCAINE HYDROCHLORIDE 20 MG: 20 INJECTION, SOLUTION EPIDURAL; INFILTRATION; INTRACAUDAL at 14:28

## 2022-05-12 RX ADMIN — ONDANSETRON 4 MG: 2 INJECTION INTRAMUSCULAR; INTRAVENOUS at 15:30

## 2022-05-12 RX ADMIN — FENTANYL CITRATE 50 MCG: 50 INJECTION, SOLUTION INTRAMUSCULAR; INTRAVENOUS at 15:38

## 2022-05-12 RX ADMIN — ROCURONIUM BROMIDE 35 MG: 10 INJECTION, SOLUTION INTRAVENOUS at 14:28

## 2022-05-12 RX ADMIN — MIDAZOLAM HYDROCHLORIDE 2 MG: 1 INJECTION, SOLUTION INTRAMUSCULAR; INTRAVENOUS at 14:23

## 2022-05-12 RX ADMIN — FENTANYL CITRATE 50 MCG: 50 INJECTION, SOLUTION INTRAMUSCULAR; INTRAVENOUS at 14:38

## 2022-05-12 RX ADMIN — PROPOFOL 150 MG: 10 INJECTION, EMULSION INTRAVENOUS at 14:28

## 2022-05-12 RX ADMIN — CELECOXIB 200 MG: 200 CAPSULE ORAL at 13:55

## 2022-05-12 ASSESSMENT — PAIN DESCRIPTION - PAIN TYPE
TYPE: SURGICAL PAIN

## 2022-05-12 ASSESSMENT — PAIN SCALES - GENERAL: PAIN_LEVEL: 4

## 2022-05-12 ASSESSMENT — FIBROSIS 4 INDEX: FIB4 SCORE: 0.3

## 2022-05-12 NOTE — H&P
DATE OF ADMISSION:  2022     PREOPERATIVE DIAGNOSIS:  Left ovarian cyst.     PLANNED PROCEDURE:  Diagnostic laparoscopy with left ovarian cystectomy,   possible left salpingo-oophorectomy.     HISTORY OF PRESENT ILLNESS:  This is a 26-year-old  who has had a   left-sided ovarian cyst for approximately 1 year now.  This was originally   diagnosed during her pregnancy, the patient has been unaware that she even has   this ovarian cyst.  She has had no symptoms related to the cyst.  She denies   pelvic pain, pressure, abnormal vaginal discharge, bleeding, no signs of   torsion.  After pregnancy, an ultrasound was repeated and the cyst was   persistent and she is here scheduled for a definitive management.     REVIEW OF SYSTEMS:  Negative for nausea, vomiting, chest pain, shortness of   breath.     PAST MEDICAL HISTORY:  Asthma.     PAST SURGICAL HISTORY:  Knee surgery.     MEDICATIONS:  Prenatal vitamins.     ALLERGIES:  CODEINE.     SOCIAL HISTORY:  Denies tobacco, ethanol, or drugs.     PHYSICAL EXAMINATION:  GENERAL:  She is a well-developed, well-nourished female in no apparent   distress.  VITAL SIGNS:  Weight 132 pounds.  Blood pressure was 128/80.  HEART:  Regular rate and rhythm.  LUNGS:  Clear.  ABDOMEN:  Soft and nontender.  EXTREMITIES:  Show no clubbing, cyanosis or edema.  GENITOURINARY:  She has normal external female genitalia.  Cervix is normal.    She has a large midline fluctuant mass.  The uterus is small, nontender and   mobile.     PERTINENT PREOPERATIVE EVALUATION:  The patient has undergone a most recent   ultrasound on  showing a uterus measuring 6.6x3x4 cm and a left ovary   that contains a 9.8x11.3 cm cyst that is heterogeneous with soft echoes   within, the right ovary is normal.     ASSESSMENT AND PLAN:  A 26-year-old  with a persistent left ovarian cyst.    Today, I discussed with the patient the planned procedure, which will be   laparoscopy with possible left ovarian  cystectomy and/or left   salpingo-oophorectomy if the cyst is unable to be removed successfully or if   it is completely encompassing the ovary and no ovarian tissue is evaluated.    We discussed the risks of procedure, which can include infection, bleeding,   scar, damage to bowel, bladder, ureters, emergency blood products.  The   patient voiced understanding of the risks as described and agrees to proceed   with laparoscopy and possible left ovarian cystectomy or   salpingo-oophorectomy.        ______________________________  MD AWILAD Zhang/KOM/ANTWON    DD:  05/11/2022 17:10  DT:  05/11/2022 17:33    Job#:  [id number]

## 2022-05-12 NOTE — OP REPORT
Operative procedure note    Preoperative diagnosis: Persistent left ovarian cyst    Postoperative diagnosis: Same    Procedure performed: Laparoscopic removal of left ovarian cyst    Surgeon: Dr. Uribe    Assistant: Dr. Carpio    Estimated blood loss: Minimal    Specimens sent for pathology: Left ovarian cyst    Intraoperative findings: Large left ovarian cyst, normal right ovary, uterus normal, left fallopian tube normal    Procedure in detail  After informed consent had been obtained the patient was taken to the operating room and placed in dorsolithotomy position  She was prepped and draped in usual sterile fashion and a Shipley was inserted into the bladder  A bivalve speculum was used to locate the cervix, and the anterior lip of the cervix was grasped with a single-tooth tenaculum.  Uterus was then sounded to 7 cm.  And then dilated to approximately 5 mm, at which point a 6 mm Dana tip was placed into the uterine cavity for uterine manipulation.  At that point attention was turned to the abdomen, Quarter percent Marcaine was used to infiltrate an area in the umbilicus, a 5 mm incision was made and a 5 mm port was placed under direct visualization using the camera.  On the right side and area was evaluated and found to be free of vessels midway between the anterior superior iliac spine and the umbilicus the area was infiltrated with Marcaine and then another 5 mm port was placed under direct visualization.  On the left in a similar fashion midway between the anterior superior iliac spine and the umbilicus and area was infiltrated with Marcaine, that area incised and a 10 mm port was placed in that location.  The ovarian cyst was clearly noted upon entry into the abdomen it was anterior to the uterus as it had previously been.  Endo Adrian scissors were then made to make an incision in the ovarian wall and the cyst wall was identified a hole was then made in the cyst and it was drained using the suction  .  Once the cyst was adequately drained the cyst wall was identified using both sharp and blunt dissection it was peeled free from the cortex of the ovary.  Once the cyst had been completely freed it was removed through the 10 mm port.  At that point the remainder of the abdomen was copiously irrigated and cleared of all clots and debris.  The left ovary while deflated was not bleeding.  Again there was more irrigation until hemostasis was assured.  At that point all instruments were removed from the abdomen under direct visualization  The 10 mm port site fascia was closed with a 0 Vicryl.  The skin was closed with a 4-0 Vicryl in a subcuticular fashion  The instruments were removed from the vagina  Sponge lap needle counts correct the patient was taken to the recovery room in good condition

## 2022-05-12 NOTE — OR NURSING
1545 Pt to PACU 4 from OR.  Bedside report from anesthesiologist and RN. Pt sleeping. Oral airway in place. Respirations even and unlabored. Lap incisions x3 clean and dry with dermabond. No signs of bleeding on del pad     1553 Pt awakening, oral airway DC'd    1609 oxycodone given for pain, pt denies nausea    1629 tolerating crackers, mother updated, pain improved    1650 VSS, no urge to void yet, IVF running    1710 Up to BR, able to void, dressed, into recliner    1725 DC instructions reviewed with pt and mother, verbal understanding expressed, PIV DC'd, mother enroute for pickup    1729 Handoff to Luzma MCKEON

## 2022-05-12 NOTE — ANESTHESIA PROCEDURE NOTES
Airway    Date/Time: 5/12/2022 2:32 PM  Performed by: Brook Delgadillo M.D.  Authorized by: Brook Delgadillo M.D.     Location:  OR  Urgency:  Elective  Indications for Airway Management:  Anesthesia      Spontaneous Ventilation: absent    Sedation Level:  Deep  Preoxygenated: Yes    Patient Position:  Sniffing  Mask Difficulty Assessment:  1 - vent by mask  Final Airway Type:  Endotracheal airway  Final Endotracheal Airway:  ETT  Cuffed: Yes    Technique Used for Successful ETT Placement:  Direct laryngoscopy    Insertion Site:  Oral  Blade Type:  Dutch  Laryngoscope Blade/Videolaryngoscope Blade Size:  3  ETT Size (mm):  6.5  Measured from:  Teeth  ETT to Teeth (cm):  20  Placement Verified by: auscultation and capnometry    Cormack-Lehane Classification:  Grade I - full view of glottis  Number of Attempts at Approach:  1  Number of Other Approaches Attempted:  0

## 2022-05-12 NOTE — ANESTHESIA POSTPROCEDURE EVALUATION
Patient: Maria E Cisneros    Procedure Summary     Date: 05/12/22 Room / Location: [facility] ROOM 25 / SURGERY SAME DAY [facility]    Anesthesia Start: 1423 Anesthesia Stop: 1546    Procedures:       PELVISCOPY (N/A Abdomen)      EXCISION, CYST, OVARY (Left Abdomen) Diagnosis: (LEFT OVARIAN CYST)    Surgeons: Christine Uribe M.D. Responsible Provider: Brook Delgadillo M.D.    Anesthesia Type: general ASA Status: 2          Final Anesthesia Type: general  Last vitals  BP   Blood Pressure: 100/60    Temp   36.2 °C (97.1 °F)    Pulse   97   Resp   16    SpO2   99 %      Anesthesia Post Evaluation    Patient location during evaluation: PACU  Patient participation: complete - patient participated  Level of consciousness: awake and alert  Pain score: 4    Airway patency: patent  Anesthetic complications: no  Cardiovascular status: hemodynamically stable  Respiratory status: acceptable  Hydration status: euvolemic    PONV: none          No complications documented.     Nurse Pain Score: 4 (NPRS)

## 2022-05-12 NOTE — ANESTHESIA PREPROCEDURE EVALUATION
Case: [id number] Date/Time: 05/12/22 1415    Procedures:       PELVISCOPY      SALPINGO-OOPHORECTOMY (Left )    Pre-op diagnosis: LEFT OVARIAN CYST    Location: [facility] ROOM 25 / SURGERY SAME DAY [facility]    Surgeons: Christine Uribe M.D.          Relevant Problems   PULMONARY   (positive) Extrinsic asthma       Physical Exam    Airway   Mallampati: II  TM distance: >3 FB  Neck ROM: full       Cardiovascular - normal exam  Rhythm: regular  Rate: normal  (-) murmur     Dental - normal exam           Pulmonary - normal exam  Breath sounds clear to auscultation     Abdominal    Neurological - normal exam                 Anesthesia Plan    ASA 2       Plan - general       Airway plan will be ETT    (Pt is breastfeeding, advised to wait pump and dump milk tonight. Pt understands. )    Plan Factors:   Patient was previously instructed to abstain from smoking on day of procedure.  Patient did not smoke on day of procedure.      Induction: intravenous    Postoperative Plan: Postoperative administration of opioids is intended.    Pertinent diagnostic labs and testing reviewed    Informed Consent:    Anesthetic plan and risks discussed with patient.    Use of blood products discussed with: patient whom consented to blood products.

## 2022-05-12 NOTE — DISCHARGE INSTRUCTIONS
ACTIVITY: Rest and take it easy for the first 24 hours.  A responsible adult is recommended to remain with you during that time.  It is normal to feel sleepy.  We encourage you to not do anything that requires balance, judgment or coordination.    MILD FLU-LIKE SYMPTOMS ARE NORMAL. YOU MAY EXPERIENCE GENERALIZED MUSCLE ACHES, THROAT IRRITATION, HEADACHE AND/OR SOME NAUSEA.    FOR 24 HOURS DO NOT:  Drive, operate machinery or run household appliances.  Drink beer or alcoholic beverages.   Make important decisions or sign legal documents.    SPECIAL INSTRUCTIONS: See Handout    DIET: To avoid nausea, slowly advance diet as tolerated, avoiding spicy or greasy foods for the first day.  Add more substantial food to your diet according to your physician's instructions.  Babies can be fed formula or breast milk as soon as they are hungry.  INCREASE FLUIDS AND FIBER TO AVOID CONSTIPATION.    SURGICAL DRESSING/BATHING: Ok to shower tomorrow, avoid submerging in water (hot tub, pool, bath) until ok with doctor    FOLLOW-UP APPOINTMENT:  A follow-up appointment should be arranged with Dr. Uribe [phone]; call to schedule.    You should CALL YOUR PHYSICIAN if you develop:  Fever greater than 101 degrees F.  Pain not relieved by medication, or persistent nausea or vomiting.  Excessive bleeding (blood soaking through dressing) or unexpected drainage from the wound.  Extreme redness or swelling around the incision site, drainage of pus or foul smelling drainage.  Inability to urinate or empty your bladder within 8 hours.  Problems with breathing or chest pain.    You should call 911 if you develop problems with breathing or chest pain.  If you are unable to contact your doctor or surgical center, you should go to the nearest emergency room or urgent care center.  Physician's telephone #: Dr. Uribe [phone]    If any questions arise, call your doctor.  If your doctor is not available, please feel free to call the  Surgical Center at [phone].     A registered nurse may call you a few days after your surgery to see how you are doing after your procedure.    MEDICATIONS: Resume taking daily medication.  Take prescribed pain medication with food.  If no medication is prescribed, you may take non-aspirin pain medication if needed.  PAIN MEDICATION CAN BE VERY CONSTIPATING.  Take a stool softener or laxative such as senokot, pericolace, or milk of magnesia if needed.    Prescription given prior to surgery.  Last pain medication given: oxycodone 5mg at 4:09pm.    If your physician has prescribed pain medication that includes Acetaminophen (Tylenol), do not take additional Acetaminophen (Tylenol) while taking the prescribed medication.    Depression / Suicide Risk    As you are discharged from this [facility] facility, it is important to learn how to keep safe from harming yourself.    Recognize the warning signs:  Abrupt changes in personality, positive or negative- including increase in energy   Giving away possessions  Change in eating patterns- significant weight changes-  positive or negative  Change in sleeping patterns- unable to sleep or sleeping all the time   Unwillingness or inability to communicate  Depression  Unusual sadness, discouragement and loneliness  Talk of wanting to die  Neglect of personal appearance   Rebelliousness- reckless behavior  Withdrawal from people/activities they love  Confusion- inability to concentrate     If you or a loved one observes any of these behaviors or has concerns about self-harm, here's what you can do:  Talk about it- your feelings and reasons for harming yourself  Remove any means that you might use to hurt yourself (examples: pills, rope, extension cords, firearm)  Get professional help from the community (Mental Health, Substance Abuse, psychological counseling)  Do not be alone:Call your Safe Contact- someone whom you trust who will be there for you.  Call your local  CRISIS HOTLINE 430-8906 or 900-165-8739  Call your local Children's Mobile Crisis Response Team [place] (951) 458-5743 or www.Lemonwise.Gynzy  Call the toll free National Suicide Prevention Hotlines   National Suicide Prevention Lifeline 124-765-VUYV (2510)  National Hope Line Network 800-SUICIDE (346-5490)

## 2022-05-13 NOTE — OR NURSING
1730 - Pt having cheese stick, brother bedside, discharge instructions reviewed.  Questions answered, awaiting ride to arrive.     1813 - Pt stable to discharge. Taken via wheelchair to car.  Pt has all belongings with them.

## 2023-10-01 ENCOUNTER — TELEMEDICINE (OUTPATIENT)
Dept: TELEHEALTH | Facility: TELEMEDICINE | Age: 28
End: 2023-10-01
Payer: COMMERCIAL

## 2023-10-01 DIAGNOSIS — J06.9 UPPER RESPIRATORY INFECTION, VIRAL: ICD-10-CM

## 2023-10-01 PROCEDURE — 99213 OFFICE O/P EST LOW 20 MIN: CPT | Mod: 95 | Performed by: NURSE PRACTITIONER

## 2023-10-01 ASSESSMENT — ENCOUNTER SYMPTOMS
PALPITATIONS: 0
VOMITING: 0
SORE THROAT: 1
CHILLS: 0
WHEEZING: 0
NAUSEA: 0
COUGH: 0
SINUS PAIN: 1
HEADACHES: 0
DIARRHEA: 0
MYALGIAS: 0
SPUTUM PRODUCTION: 0
DIZZINESS: 0
SHORTNESS OF BREATH: 0
FEVER: 0

## 2023-10-01 NOTE — PROGRESS NOTES
Telemedicine: Established Patient   This evaluation was conducted via Zoom using secure and encrypted videoconferencing technology. The patient was in their home in the [facility].    The patient's identity was confirmed and verbal consent was obtained for this virtual visit.    Subjective:   CC:   Chief Complaint   Patient presents with    Nasal Congestion       Maria E Cisneros is a 27 y.o. female presenting for evaluation of upper respiratory nasal congestion, sinus pain and pressure, fatigue, and sore throat that started yesterday.  Patient denies any fever, chills, ear pain, cough, chest pain, or shortness of breath.  She has been doing over-the-counter [place] pot, and Sudafed with some relief of symptoms.  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  She denies any known exposure to COVID, influenza, or strep      Review of Systems   Constitutional:  Positive for malaise/fatigue. Negative for chills and fever.   HENT:  Positive for congestion, sinus pain and sore throat. Negative for ear pain.    Respiratory:  Negative for cough, sputum production, shortness of breath and wheezing.    Cardiovascular:  Negative for chest pain and palpitations.   Gastrointestinal:  Negative for diarrhea, nausea and vomiting.   Musculoskeletal:  Negative for myalgias.   Neurological:  Negative for dizziness and headaches.         Allergies   Allergen Reactions    Codeine Vomiting    Vicodin [Apap-Fd&C Yellow #10 [place]-Hydrocodone] Vomiting       Current medicines (including changes today)  Current Outpatient Medications   Medication Sig Dispense Refill    Prenatal MV & Min w/FA-DHA (PRENATAL ADULT GUMMY/DHA/FA PO) Take 2 Tablets by mouth every day.      FLUoxetine (PROZAC) 20 MG Cap Take 60 mg by mouth every day.       No current facility-administered medications for this visit.       Patient Active Problem List    Diagnosis Date Noted    Labor and delivery indication for care or intervention 01/01/2022    Less  than 8 weeks gestation of pregnancy 05/07/2021    Sensorineural hearing loss 02/18/2020    Extrinsic asthma 02/18/2020    ASCUS with positive high risk HPV cervical 11/25/2015    Encounter for insertion of mirena IUD 10/29/2015    Vasodepressor syncope 08/06/2013    Family hx-breast malignancy 03/21/2013    Eating disorder 02/16/2012    Depression 02/16/2012    Other acne 02/04/2009    Dizziness and giddiness 01/23/2009       She  has a past medical history of Allergy, Asthma, and Depression.  She  has a past surgical history that includes other orthopedic surgery (2010); other; pr lap,diagnostic abdomen (N/A, 5/12/2022); and ovarian cystectomy (Left, 5/12/2022).       Objective:   There were no vitals taken for this visit.    Physical Exam  Constitutional:       General: She is not in acute distress.     Appearance: Normal appearance. She is ill-appearing. She is not toxic-appearing.   HENT:      Head: Normocephalic.      Nose: Nose normal.   Eyes:      Extraocular Movements: Extraocular movements intact.      Pupils: Pupils are equal, round, and reactive to light.   Cardiovascular:      Rate and Rhythm: Normal rate.   Pulmonary:      Effort: Pulmonary effort is normal.      Breath sounds: Normal breath sounds.   Abdominal:      General: Abdomen is flat. Bowel sounds are normal. There is no distension.      Palpations: Abdomen is soft.      Tenderness: There is no abdominal tenderness.   Musculoskeletal:         General: Normal range of motion.      Cervical back: Normal range of motion and neck supple.   Skin:     General: Skin is warm and dry.   Neurological:      General: No focal deficit present.      Mental Status: She is alert and oriented to person, place, and time.   Psychiatric:         Mood and Affect: Mood normal.         Behavior: Behavior normal.         Thought Content: Thought content normal.         Judgment: Judgment normal.         Assessment and Plan:   The following treatment plan was discussed:      1. Upper respiratory infection, viral    Patient was seen today via virtual platform.  Unfortunately unable to test for COVID, influenza, RSV.  Recommended that patient get an at home test kit for COVID as I am highly suspicious that she may have contracted COVID-19.  Her symptoms seem to be mild in nature at this time.    OTC Tylenol or Motrin for fever/discomfort.  OTC cough/cold medication for symptomatic relief  OTC Supportive Care for Congestion - saline nasal spray or neti pot  Drink plenty of fluids  Advised the patient to stay at home under self-isolation according to CDC guidelines.  Provided the patient with home isolation and self quarantine instructions  Follow-up with PCP  Return to clinic or go to the ED if symptoms worsen or fail to improve, or if the patient should develop worsening/increasing cough, congestion, ear pain, sore throat, shortness of breath, wheezing, chest pain, fever/chills, and/or any concerning symptoms.

## 2023-11-07 ENCOUNTER — OFFICE VISIT (OUTPATIENT)
Dept: URGENT CARE | Facility: CLINIC | Age: 28
End: 2023-11-07
Payer: COMMERCIAL

## 2023-11-07 VITALS
DIASTOLIC BLOOD PRESSURE: 60 MMHG | WEIGHT: 129 LBS | OXYGEN SATURATION: 97 % | TEMPERATURE: 98.1 F | BODY MASS INDEX: 22.02 KG/M2 | HEART RATE: 84 BPM | SYSTOLIC BLOOD PRESSURE: 102 MMHG | HEIGHT: 64 IN | RESPIRATION RATE: 16 BRPM

## 2023-11-07 DIAGNOSIS — H10.33 ACUTE BACTERIAL CONJUNCTIVITIS OF BOTH EYES: ICD-10-CM

## 2023-11-07 PROCEDURE — 99213 OFFICE O/P EST LOW 20 MIN: CPT | Performed by: PHYSICIAN ASSISTANT

## 2023-11-07 PROCEDURE — 3074F SYST BP LT 130 MM HG: CPT | Performed by: PHYSICIAN ASSISTANT

## 2023-11-07 PROCEDURE — 3078F DIAST BP <80 MM HG: CPT | Performed by: PHYSICIAN ASSISTANT

## 2023-11-07 RX ORDER — OFLOXACIN 3 MG/ML
1 SOLUTION/ DROPS OPHTHALMIC 4 TIMES DAILY
Qty: 10 ML | Refills: 0 | Status: SHIPPED | OUTPATIENT
Start: 2023-11-07 | End: 2023-11-14

## 2023-11-07 ASSESSMENT — ENCOUNTER SYMPTOMS
FEVER: 0
DOUBLE VISION: 0
EYE REDNESS: 1
BLURRED VISION: 0
EYE DISCHARGE: 1
EYE PAIN: 0
PHOTOPHOBIA: 0
CHILLS: 0

## 2023-11-07 ASSESSMENT — VISUAL ACUITY: OU: 1

## 2023-11-07 ASSESSMENT — FIBROSIS 4 INDEX: FIB4 SCORE: 0.32

## 2023-11-07 NOTE — LETTER
November 7, 2023         Patient: Maria E Cisneros   YOB: 1995   Date of Visit: 11/7/2023           To Whom it May Concern:    Maria E Cisneros was seen in my clinic on 11/7/2023.  Please excuse patient's absence tomorrow.    If you have any questions or concerns, please don't hesitate to call.        Sincerely,           Ilya Cerna P.A.-C.  Electronically Signed

## 2023-11-08 NOTE — PROGRESS NOTES
Subjective:   Maria E Cisneros is a 28 y.o. female who presents today with   Chief Complaint   Patient presents with    Red Eye     Stared 1 day ago, Lt eye today, Rt eye yesterday, discharge       Conjunctivitis  This is a new problem. The current episode started yesterday. The problem occurs constantly. The problem has been unchanged. Pertinent negatives include no chills or fever. She has tried nothing for the symptoms. The treatment provided no relief.     Patient denies any recent injury or trauma.  She states she does not wear contacts or glasses.  She states that she started with redness and discharge in her right eye yesterday and today had discharge and redness in the left eye.    PMH:  has a past medical history of Allergy, Asthma, and Depression.  MEDS:   Current Outpatient Medications:     ofloxacin (OCUFLOX) 0.3 % Solution, Administer 1 Drop into both eyes 4 times a day for 7 days., Disp: 10 mL, Rfl: 0    FLUoxetine (PROZAC) 20 MG Cap, Take 60 mg by mouth every day., Disp: , Rfl:     Prenatal MV & Min w/FA-DHA (PRENATAL ADULT GUMMY/DHA/FA PO), Take 2 Tablets by mouth every day. (Patient not taking: Reported on 11/7/2023), Disp: , Rfl:   ALLERGIES:   Allergies   Allergen Reactions    Codeine Vomiting    Vicodin [Apap-Fd&C Yellow #10 [place]-Hydrocodone] Vomiting     SURGHX:   Past Surgical History:   Procedure Laterality Date    [place] LAP,DIAGNOSTIC ABDOMEN N/A 5/12/2022    Procedure: PELVISCOPY;  Surgeon: Christine Uribe M.D.;  Location: SURGERY SAME DAY [facility];  Service: Obstetrics    OVARIAN CYSTECTOMY Left 5/12/2022    Procedure: EXCISION, CYST, OVARY;  Surgeon: Christine Uribe M.D.;  Location: SURGERY SAME DAY [facility];  Service: Obstetrics    OTHER ORTHOPEDIC SURGERY  2010    mensicus torn, repaired    OTHER      Tonsil removal     SOCHX:  reports that she has never smoked. She has never used smokeless tobacco. She reports that she does not currently use alcohol. She reports that she does  "not use drugs.  FH: Reviewed with patient, not pertinent to this visit.       Review of Systems   Constitutional:  Negative for chills and fever.   Eyes:  Positive for discharge and redness. Negative for blurred vision, double vision, photophobia and pain.        Objective:   /60 (BP Location: Left arm, Patient Position: Sitting, BP Cuff Size: Adult)   Pulse 84   Temp 36.7 °C (98.1 °F) (Temporal)   Resp 16   Ht 1.613 m (5' 3.5\")   Wt 58.5 kg (129 lb)   SpO2 97%   Breastfeeding No   BMI 22.49 kg/m²   Physical Exam  Vitals and nursing note reviewed.   Constitutional:       General: She is not in acute distress.     Appearance: Normal appearance. She is well-developed. She is not ill-appearing or toxic-appearing.   HENT:      Head: Normocephalic and atraumatic.      Right Ear: Hearing normal.      Left Ear: Hearing normal.   Eyes:      General: Vision grossly intact.         Right eye: No discharge.         Left eye: Discharge present.     Extraocular Movements: Extraocular movements intact.      Conjunctiva/sclera:      Right eye: Right conjunctiva is injected.      Left eye: Left conjunctiva is injected.      Pupils: Pupils are equal, round, and reactive to light.   Cardiovascular:      Rate and Rhythm: Normal rate.   Pulmonary:      Effort: Pulmonary effort is normal.   Musculoskeletal:      Comments: Normal movement in all 4 extremities   Skin:     General: Skin is warm and dry.   Neurological:      Mental Status: She is alert.      Coordination: Coordination normal.   Psychiatric:         Mood and Affect: Mood normal.       Assessment/Plan:   Assessment    1. Acute bacterial conjunctivitis of both eyes  - ofloxacin (OCUFLOX) 0.3 % Solution; Administer 1 Drop into both eyes 4 times a day for 7 days.  Dispense: 10 mL; Refill: 0    Symptoms and presentation appear consistent with bacterial conjunctivitis at this time and we will treat accordingly with antibiotic drops.  Differential diagnosis, natural " history, supportive care, and indications for immediate follow-up discussed.   Patient given instructions and understanding of medications and treatment.    If not improving in 3-5 days, F/U with PCP or return to UC if symptoms worsen.    Patient agreeable to plan.      Please note that this dictation was created using voice recognition software. I have made every reasonable attempt to correct obvious errors, but I expect that there are errors of grammar and possibly content that I did not discover before finalizing the note.    Ilya Cerna PA-C

## 2024-01-22 ENCOUNTER — OFFICE VISIT (OUTPATIENT)
Dept: URGENT CARE | Facility: CLINIC | Age: 29
End: 2024-01-22
Payer: COMMERCIAL

## 2024-01-22 VITALS
TEMPERATURE: 98 F | RESPIRATION RATE: 16 BRPM | BODY MASS INDEX: 23.04 KG/M2 | HEART RATE: 101 BPM | WEIGHT: 130 LBS | DIASTOLIC BLOOD PRESSURE: 68 MMHG | OXYGEN SATURATION: 95 % | SYSTOLIC BLOOD PRESSURE: 98 MMHG | HEIGHT: 63 IN

## 2024-01-22 DIAGNOSIS — B96.89 BACTERIAL SINUSITIS: ICD-10-CM

## 2024-01-22 DIAGNOSIS — J32.9 BACTERIAL SINUSITIS: ICD-10-CM

## 2024-01-22 DIAGNOSIS — R00.0 TACHYCARDIA: ICD-10-CM

## 2024-01-22 PROCEDURE — 3078F DIAST BP <80 MM HG: CPT | Performed by: FAMILY MEDICINE

## 2024-01-22 PROCEDURE — 99214 OFFICE O/P EST MOD 30 MIN: CPT | Performed by: FAMILY MEDICINE

## 2024-01-22 PROCEDURE — 3074F SYST BP LT 130 MM HG: CPT | Performed by: FAMILY MEDICINE

## 2024-01-22 RX ORDER — AMOXICILLIN AND CLAVULANATE POTASSIUM 875; 125 MG/1; MG/1
1 TABLET, FILM COATED ORAL 2 TIMES DAILY
Qty: 10 TABLET | Refills: 0 | Status: SHIPPED | OUTPATIENT
Start: 2024-01-22 | End: 2024-01-27

## 2024-01-22 ASSESSMENT — FIBROSIS 4 INDEX: FIB4 SCORE: 0.32

## 2024-01-23 NOTE — PROGRESS NOTES
"  Subjective:      28 y.o. female presents to urgent care for cold symptoms that started about 10 days ago.  She is experiencing headache, increased sinus pressure, sore throat, and cough. Heart rate is elevated today in urgent care.  She denies any chest pain, palpitations, or shortness of breath.  No tobacco product use.  No history of asthma or COPD.  She is vaccinated against COVID.  She had a negative home COVID test. Several family members were recently sick with similar symptoms.     She denies any other questions or concerns at this time.    Current problem list, medication, and past medical/surgical history were reviewed in Epic.    ROS  See HPI     Objective:      BP 98/68 (BP Location: Left arm, Patient Position: Sitting, BP Cuff Size: Large adult)   Pulse (!) 101   Temp 36.7 °C (98 °F)   Resp 16   Ht 1.6 m (5' 3\")   Wt 59 kg (130 lb)   SpO2 95%   BMI 23.03 kg/m²     Physical Exam  Constitutional:       General: She is not in acute distress.     Appearance: She is not diaphoretic.   HENT:      Right Ear: Tympanic membrane, ear canal and external ear normal.      Left Ear: Tympanic membrane, ear canal and external ear normal.      Nose:      Right Sinus: Maxillary sinus tenderness and frontal sinus tenderness present.      Left Sinus: Maxillary sinus tenderness and frontal sinus tenderness present.      Mouth/Throat:      Tongue: Tongue does not deviate from midline.      Palate: No lesions.      Pharynx: Uvula midline. No oropharyngeal exudate or posterior oropharyngeal erythema.      Tonsils: No tonsillar exudate.   Cardiovascular:      Rate and Rhythm: Regular rhythm. Tachycardia present.      Heart sounds: Normal heart sounds.   Pulmonary:      Effort: Pulmonary effort is normal. No respiratory distress.      Breath sounds: Normal breath sounds.   Neurological:      Mental Status: She is alert.   Psychiatric:         Mood and Affect: Affect normal.         Judgment: Judgment normal. "       Assessment/Plan:     1. Bacterial sinusitis  2. Tachycardia  Systemic symptoms seen through tachycardia.  This is asymptomatic.  Criteria for bacterial sinusitis has been met.  Prescription for Augmentin has been sent.  Tylenol, ibuprofen, and Flonase as needed for symptomatic relief.  - amoxicillin-clavulanate (AUGMENTIN) 875-125 MG Tab; Take 1 Tablet by mouth 2 times a day for 5 days.  Dispense: 10 Tablet; Refill: 0      Instructed to return to Urgent Care or nearest Emergency Department if symptoms fail to improve, for any change in condition, further concerns, or new concerning symptoms. Patient states understanding of the plan of care and discharge instructions.    Dianne Salazar M.D.

## 2024-05-04 SDOH — ECONOMIC STABILITY: FOOD INSECURITY: WITHIN THE PAST 12 MONTHS, THE FOOD YOU BOUGHT JUST DIDN'T LAST AND YOU DIDN'T HAVE MONEY TO GET MORE.: NEVER TRUE

## 2024-05-04 SDOH — ECONOMIC STABILITY: TRANSPORTATION INSECURITY
IN THE PAST 12 MONTHS, HAS THE LACK OF TRANSPORTATION KEPT YOU FROM MEDICAL APPOINTMENTS OR FROM GETTING MEDICATIONS?: NO

## 2024-05-04 SDOH — ECONOMIC STABILITY: TRANSPORTATION INSECURITY
IN THE PAST 12 MONTHS, HAS LACK OF RELIABLE TRANSPORTATION KEPT YOU FROM MEDICAL APPOINTMENTS, MEETINGS, WORK OR FROM GETTING THINGS NEEDED FOR DAILY LIVING?: NO

## 2024-05-04 SDOH — ECONOMIC STABILITY: INCOME INSECURITY: IN THE LAST 12 MONTHS, WAS THERE A TIME WHEN YOU WERE NOT ABLE TO PAY THE MORTGAGE OR RENT ON TIME?: NO

## 2024-05-04 SDOH — ECONOMIC STABILITY: INCOME INSECURITY: HOW HARD IS IT FOR YOU TO PAY FOR THE VERY BASICS LIKE FOOD, HOUSING, MEDICAL CARE, AND HEATING?: NOT HARD AT ALL

## 2024-05-04 SDOH — HEALTH STABILITY: PHYSICAL HEALTH: ON AVERAGE, HOW MANY DAYS PER WEEK DO YOU ENGAGE IN MODERATE TO STRENUOUS EXERCISE (LIKE A BRISK WALK)?: 2 DAYS

## 2024-05-04 SDOH — ECONOMIC STABILITY: TRANSPORTATION INSECURITY
IN THE PAST 12 MONTHS, HAS LACK OF TRANSPORTATION KEPT YOU FROM MEETINGS, WORK, OR FROM GETTING THINGS NEEDED FOR DAILY LIVING?: NO

## 2024-05-04 SDOH — HEALTH STABILITY: MENTAL HEALTH
STRESS IS WHEN SOMEONE FEELS TENSE, NERVOUS, ANXIOUS, OR CAN'T SLEEP AT NIGHT BECAUSE THEIR MIND IS TROUBLED. HOW STRESSED ARE YOU?: NOT AT ALL

## 2024-05-04 SDOH — ECONOMIC STABILITY: FOOD INSECURITY: WITHIN THE PAST 12 MONTHS, YOU WORRIED THAT YOUR FOOD WOULD RUN OUT BEFORE YOU GOT MONEY TO BUY MORE.: NEVER TRUE

## 2024-05-04 SDOH — ECONOMIC STABILITY: HOUSING INSECURITY
IN THE LAST 12 MONTHS, WAS THERE A TIME WHEN YOU DID NOT HAVE A STEADY PLACE TO SLEEP OR SLEPT IN A SHELTER (INCLUDING NOW)?: NO

## 2024-05-04 SDOH — ECONOMIC STABILITY: HOUSING INSECURITY: IN THE LAST 12 MONTHS, HOW MANY PLACES HAVE YOU LIVED?: 1

## 2024-05-04 SDOH — HEALTH STABILITY: PHYSICAL HEALTH: ON AVERAGE, HOW MANY MINUTES DO YOU ENGAGE IN EXERCISE AT THIS LEVEL?: 20 MIN

## 2024-05-04 ASSESSMENT — SOCIAL DETERMINANTS OF HEALTH (SDOH)
DO YOU BELONG TO ANY CLUBS OR ORGANIZATIONS SUCH AS CHURCH GROUPS UNIONS, FRATERNAL OR ATHLETIC GROUPS, OR SCHOOL GROUPS?: NO
IN A TYPICAL WEEK, HOW MANY TIMES DO YOU TALK ON THE PHONE WITH FAMILY, FRIENDS, OR NEIGHBORS?: MORE THAN THREE TIMES A WEEK
HOW OFTEN DO YOU ATTEND CHURCH OR RELIGIOUS SERVICES?: NEVER
HOW OFTEN DO YOU GET TOGETHER WITH FRIENDS OR RELATIVES?: ONCE A WEEK
WITHIN THE PAST 12 MONTHS, YOU WORRIED THAT YOUR FOOD WOULD RUN OUT BEFORE YOU GOT THE MONEY TO BUY MORE: NEVER TRUE
HOW OFTEN DO YOU ATTEND CHURCH OR RELIGIOUS SERVICES?: NEVER
HOW OFTEN DO YOU ATTENT MEETINGS OF THE CLUB OR ORGANIZATION YOU BELONG TO?: NEVER
HOW OFTEN DO YOU GET TOGETHER WITH FRIENDS OR RELATIVES?: ONCE A WEEK
DO YOU BELONG TO ANY CLUBS OR ORGANIZATIONS SUCH AS CHURCH GROUPS UNIONS, FRATERNAL OR ATHLETIC GROUPS, OR SCHOOL GROUPS?: NO
HOW HARD IS IT FOR YOU TO PAY FOR THE VERY BASICS LIKE FOOD, HOUSING, MEDICAL CARE, AND HEATING?: NOT HARD AT ALL
DO YOU BELONG TO ANY CLUBS OR ORGANIZATIONS SUCH AS CHURCH GROUPS UNIONS, FRATERNAL OR ATHLETIC GROUPS, OR SCHOOL GROUPS?: NO
IN A TYPICAL WEEK, HOW MANY TIMES DO YOU TALK ON THE PHONE WITH FAMILY, FRIENDS, OR NEIGHBORS?: MORE THAN THREE TIMES A WEEK
IN A TYPICAL WEEK, HOW MANY TIMES DO YOU TALK ON THE PHONE WITH FAMILY, FRIENDS, OR NEIGHBORS?: MORE THAN THREE TIMES A WEEK
HOW OFTEN DO YOU ATTENT MEETINGS OF THE CLUB OR ORGANIZATION YOU BELONG TO?: NEVER
DO YOU BELONG TO ANY CLUBS OR ORGANIZATIONS SUCH AS CHURCH GROUPS UNIONS, FRATERNAL OR ATHLETIC GROUPS, OR SCHOOL GROUPS?: NO
HOW OFTEN DO YOU ATTEND CHURCH OR RELIGIOUS SERVICES?: NEVER
HOW OFTEN DO YOU HAVE SIX OR MORE DRINKS ON ONE OCCASION: NEVER
HOW OFTEN DO YOU ATTEND CHURCH OR RELIGIOUS SERVICES?: NEVER
HOW HARD IS IT FOR YOU TO PAY FOR THE VERY BASICS LIKE FOOD, HOUSING, MEDICAL CARE, AND HEATING?: NOT HARD AT ALL
HOW MANY DRINKS CONTAINING ALCOHOL DO YOU HAVE ON A TYPICAL DAY WHEN YOU ARE DRINKING: PATIENT DOES NOT DRINK
HOW OFTEN DO YOU ATTENT MEETINGS OF THE CLUB OR ORGANIZATION YOU BELONG TO?: NEVER
HOW OFTEN DO YOU HAVE SIX OR MORE DRINKS ON ONE OCCASION: NEVER
HOW OFTEN DO YOU ATTENT MEETINGS OF THE CLUB OR ORGANIZATION YOU BELONG TO?: NEVER
IN A TYPICAL WEEK, HOW MANY TIMES DO YOU TALK ON THE PHONE WITH FAMILY, FRIENDS, OR NEIGHBORS?: MORE THAN THREE TIMES A WEEK
HOW MANY DRINKS CONTAINING ALCOHOL DO YOU HAVE ON A TYPICAL DAY WHEN YOU ARE DRINKING: PATIENT DOES NOT DRINK
WITHIN THE PAST 12 MONTHS, YOU WORRIED THAT YOUR FOOD WOULD RUN OUT BEFORE YOU GOT THE MONEY TO BUY MORE: NEVER TRUE
HOW OFTEN DO YOU GET TOGETHER WITH FRIENDS OR RELATIVES?: ONCE A WEEK
HOW OFTEN DO YOU HAVE A DRINK CONTAINING ALCOHOL: NEVER
HOW OFTEN DO YOU HAVE A DRINK CONTAINING ALCOHOL: NEVER
HOW OFTEN DO YOU GET TOGETHER WITH FRIENDS OR RELATIVES?: ONCE A WEEK

## 2024-05-04 ASSESSMENT — LIFESTYLE VARIABLES
SKIP TO QUESTIONS 9-10: 1
HOW MANY STANDARD DRINKS CONTAINING ALCOHOL DO YOU HAVE ON A TYPICAL DAY: PATIENT DOES NOT DRINK
SKIP TO QUESTIONS 9-10: 1
HOW OFTEN DO YOU HAVE A DRINK CONTAINING ALCOHOL: NEVER
AUDIT-C TOTAL SCORE: 0
AUDIT-C TOTAL SCORE: 0
HOW OFTEN DO YOU HAVE SIX OR MORE DRINKS ON ONE OCCASION: NEVER
HOW OFTEN DO YOU HAVE A DRINK CONTAINING ALCOHOL: NEVER
HOW MANY STANDARD DRINKS CONTAINING ALCOHOL DO YOU HAVE ON A TYPICAL DAY: PATIENT DOES NOT DRINK
HOW OFTEN DO YOU HAVE SIX OR MORE DRINKS ON ONE OCCASION: NEVER

## 2024-05-07 ENCOUNTER — APPOINTMENT (OUTPATIENT)
Dept: MEDICAL GROUP | Facility: LAB | Age: 29
End: 2024-05-07
Payer: COMMERCIAL

## 2024-05-24 ENCOUNTER — OFFICE VISIT (OUTPATIENT)
Dept: URGENT CARE | Facility: CLINIC | Age: 29
End: 2024-05-24
Payer: COMMERCIAL

## 2024-05-24 VITALS
SYSTOLIC BLOOD PRESSURE: 98 MMHG | TEMPERATURE: 98.5 F | DIASTOLIC BLOOD PRESSURE: 58 MMHG | HEART RATE: 78 BPM | RESPIRATION RATE: 16 BRPM | WEIGHT: 130 LBS | OXYGEN SATURATION: 98 % | HEIGHT: 63 IN | BODY MASS INDEX: 23.04 KG/M2

## 2024-05-24 DIAGNOSIS — B30.9 ACUTE VIRAL CONJUNCTIVITIS OF BOTH EYES: ICD-10-CM

## 2024-05-24 PROCEDURE — 3074F SYST BP LT 130 MM HG: CPT | Performed by: NURSE PRACTITIONER

## 2024-05-24 PROCEDURE — 3078F DIAST BP <80 MM HG: CPT | Performed by: NURSE PRACTITIONER

## 2024-05-24 PROCEDURE — 99213 OFFICE O/P EST LOW 20 MIN: CPT | Performed by: NURSE PRACTITIONER

## 2024-05-24 RX ORDER — KETOTIFEN FUMARATE 0.35 MG/ML
1 SOLUTION/ DROPS OPHTHALMIC 2 TIMES DAILY
COMMUNITY
Start: 2024-05-24

## 2024-05-24 RX ORDER — OFLOXACIN 3 MG/ML
1 SOLUTION/ DROPS OPHTHALMIC 4 TIMES DAILY
Qty: 5 ML | Refills: 0 | Status: SHIPPED | OUTPATIENT
Start: 2024-05-24 | End: 2024-05-31

## 2024-05-24 ASSESSMENT — ENCOUNTER SYMPTOMS
DOUBLE VISION: 0
FEVER: 0
SORE THROAT: 0
EYE PAIN: 0
COUGH: 0
BLURRED VISION: 0
EYE REDNESS: 1
EYE DISCHARGE: 1

## 2024-05-25 NOTE — PATIENT INSTRUCTIONS
-Eyelid and hand hygiene.  -Cool compress.      Follow up for persistent or worsening symptoms, increased redness or swelling, vision changes, decreased eye movement, new or increased pain, or fever.

## 2024-05-25 NOTE — PROGRESS NOTES
Subjective:     Maria E Cisneros is a 28 y.o. female who presents for Red Eye (Bilateral eyes, Lt eye's worse x this morning, had eye drainage )      Woke up with crusting to her left eye this morning. Has a heaviness in eyelids. Mild itchy to the corners of her eyes. Does not wear contacts. Works with kids. No new eye products. Denies URI symptoms.     Eye Drainage  This is a new problem. The current episode started today. Pertinent negatives include no coughing, fever or sore throat.       Past Medical History:   Diagnosis Date    Allergy     nasal congestion, chronic    Asthma     exercise induced asthma    Depression        Past Surgical History:   Procedure Laterality Date    [place] LAP,DIAGNOSTIC ABDOMEN N/A 5/12/2022    Procedure: PELVISCOPY;  Surgeon: Christine Uribe M.D.;  Location: SURGERY SAME DAY [facility];  Service: Obstetrics    OVARIAN CYSTECTOMY Left 5/12/2022    Procedure: EXCISION, CYST, OVARY;  Surgeon: Christine Uribe M.D.;  Location: SURGERY SAME DAY [facility];  Service: Obstetrics    OTHER ORTHOPEDIC SURGERY  2010    mensicus torn, repaired    OTHER      Tonsil removal       Social History     Socioeconomic History    Marital status:      Spouse name: Not on file    Number of children: Not on file    Years of education: Not on file    Highest education level: Master's degree (e.g., MA, MS, Rainer, MEd, MSW, LEXIE)   Occupational History    Not on file   Tobacco Use    Smoking status: Never    Smokeless tobacco: Never   Vaping Use    Vaping status: Never Used   Substance and Sexual Activity    Alcohol use: Not Currently    Drug use: No    Sexual activity: Yes     Partners: Male     Comment:     Other Topics Concern    Not on file   Social History Narrative    ** Merged History Encounter **          Social Determinants of Health     Financial Resource Strain: Low Risk  (5/4/2024)    Overall Financial Resource Strain (CARDIA)     Difficulty of Paying Living Expenses: Not hard at all    Food Insecurity: No Food Insecurity (5/4/2024)    Hunger Vital Sign     Worried About Running Out of Food in the Last Year: Never true     Ran Out of Food in the Last Year: Never true   Transportation Needs: No Transportation Needs (5/4/2024)    PRAPARE - Transportation     Lack of Transportation (Medical): No     Lack of Transportation (Non-Medical): No   Physical Activity: Insufficiently Active (5/4/2024)    Exercise Vital Sign     Days of Exercise per Week: 2 days     Minutes of Exercise per Session: 20 min   Stress: No Stress Concern Present (5/4/2024)    [place] [place] [employer] - Occupational Stress Questionnaire     Feeling of Stress : Not at all   Social Connections: Moderately Isolated (5/4/2024)    Social Connection and Isolation Panel [NHANES]     Frequency of Communication with Friends and Family: More than three times a week     Frequency of Social Gatherings with Friends and Family: Once a week     Attends Religion Services: Never     Active Member of Clubs or Organizations: No     Attends Club or Organization Meetings: Never     Marital Status:    Intimate Partner Violence: Not on file   Housing Stability: Low Risk  (5/4/2024)    Housing Stability Vital Sign     Unable to Pay for Housing in the Last Year: No     Number of Places Lived in the Last Year: 1     Unstable Housing in the Last Year: No        Family History   Problem Relation Age of Onset    Cancer Mother         breaset cancer, 40's    Diabetes Father         pre    Hyperlipidemia Father     Other Brother         NF, affects brain    No Known Problems Maternal Grandmother     No Known Problems Maternal Grandfather     No Known Problems Paternal Grandmother     No Known Problems Paternal Grandfather         Allergies   Allergen Reactions    Codeine Vomiting    Vicodin [Apap-Fd&C Yellow #10 [place]-Hydrocodone] Vomiting       Review of Systems   Constitutional:  Negative for fever.   HENT:  Negative for sore throat.   "  Eyes:  Positive for discharge and redness. Negative for blurred vision, double vision and pain.   Respiratory:  Negative for cough.    Endo/Heme/Allergies:  Positive for environmental allergies.   All other systems reviewed and are negative.       Objective:   BP 98/58 (BP Location: Left arm, Patient Position: Sitting, BP Cuff Size: Adult)   Pulse 78   Temp 36.9 °C (98.5 °F) (Temporal)   Resp 16   Ht 1.6 m (5' 3\")   Wt 59 kg (130 lb)   SpO2 98%   BMI 23.03 kg/m²     Physical Exam  Vitals reviewed.   Constitutional:       General: She is not in acute distress.     Appearance: She is well-developed.   HENT:      Nose: Nose normal.      Mouth/Throat:      Pharynx: Oropharynx is clear.   Eyes:      Extraocular Movements: Extraocular movements intact.      Conjunctiva/sclera:      Right eye: Right conjunctiva is injected. No chemosis, exudate or hemorrhage.     Left eye: Left conjunctiva is injected. No chemosis, exudate or hemorrhage.     Pupils: Pupils are equal, round, and reactive to light.      Comments: Mildly injected.    Cardiovascular:      Rate and Rhythm: Normal rate.   Pulmonary:      Effort: Pulmonary effort is normal.   Skin:     General: Skin is warm and dry.      Findings: No rash.   Neurological:      Mental Status: She is alert and oriented to person, place, and time.      GCS: GCS eye subscore is 4. GCS verbal subscore is 5. GCS motor subscore is 6.   Psychiatric:         Speech: Speech normal.         Behavior: Behavior normal.         Thought Content: Thought content normal.         Judgment: Judgment normal.         Assessment/Plan:   1. Acute viral conjunctivitis of both eyes  - ofloxacin (OCUFLOX) 0.3 % Solution; Administer 1 Drop into both eyes 4 times a day for 7 days.  Dispense: 5 mL; Refill: 0  - ketotifen (ZADITOR) 0.035 % ophthalmic solution; Administer 1 Drop into both eyes 2 times a day.  -Eyelid and hand hygiene.  -Cool compress.      Follow up for persistent or worsening " symptoms, increased redness or swelling, vision changes, decreased eye movement, new or increased pain, or fever.    Discussed S&S of viral conjunctivitis, and symptomatic care. May develop URI symptoms. Was given contingent empiric antibiotic tx for onset of symptoms consistent with a bacterial conjunctivitis.     Differential diagnosis, natural history, supportive care, and indications for immediate follow-up discussed.

## 2024-06-03 ENCOUNTER — OFFICE VISIT (OUTPATIENT)
Dept: URGENT CARE | Facility: CLINIC | Age: 29
End: 2024-06-03
Payer: COMMERCIAL

## 2024-06-03 ENCOUNTER — APPOINTMENT (OUTPATIENT)
Dept: RADIOLOGY | Facility: IMAGING CENTER | Age: 29
End: 2024-06-03
Attending: PHYSICIAN ASSISTANT
Payer: COMMERCIAL

## 2024-06-03 VITALS
BODY MASS INDEX: 23.04 KG/M2 | HEART RATE: 73 BPM | DIASTOLIC BLOOD PRESSURE: 70 MMHG | RESPIRATION RATE: 15 BRPM | HEIGHT: 63 IN | WEIGHT: 130 LBS | OXYGEN SATURATION: 100 % | SYSTOLIC BLOOD PRESSURE: 110 MMHG | TEMPERATURE: 98 F

## 2024-06-03 DIAGNOSIS — R07.81 RIB PAIN ON RIGHT SIDE: ICD-10-CM

## 2024-06-03 PROCEDURE — 71101 X-RAY EXAM UNILAT RIBS/CHEST: CPT | Mod: TC,RT | Performed by: RADIOLOGY

## 2024-06-03 PROCEDURE — 3074F SYST BP LT 130 MM HG: CPT | Performed by: PHYSICIAN ASSISTANT

## 2024-06-03 PROCEDURE — 99214 OFFICE O/P EST MOD 30 MIN: CPT | Performed by: PHYSICIAN ASSISTANT

## 2024-06-03 PROCEDURE — 3078F DIAST BP <80 MM HG: CPT | Performed by: PHYSICIAN ASSISTANT

## 2024-06-03 ASSESSMENT — ENCOUNTER SYMPTOMS
WEAKNESS: 0
EYES NEGATIVE: 1
CONSTITUTIONAL NEGATIVE: 1
RESPIRATORY NEGATIVE: 1
MUSCULOSKELETAL NEGATIVE: 1
ABDOMINAL PAIN: 0
GASTROINTESTINAL NEGATIVE: 1
CHANGE IN BOWEL HABIT: 0
FEVER: 0
NEUROLOGICAL NEGATIVE: 1
CARDIOVASCULAR NEGATIVE: 1
FATIGUE: 0

## 2024-06-04 NOTE — PROGRESS NOTES
Subjective     Maria E Cisneros is a very pleasant 28 y.o. female who presents with Rib Injury (RT side of sternum and underneath breast pain radiating to the RT side of her back x2-3 weeks. Initially thought it was from muscle soreness but pain has been intensifying. When she turns, coughs, or sneezes she feels a sharp jolt. No relief w/ tylenol. )            Rib Injury  This is a new problem. The current episode started 1 to 4 weeks ago. The problem occurs constantly. The problem has been unchanged. Pertinent negatives include no abdominal pain, change in bowel habit, chest pain, chills, congestion, coughing, fatigue, fever, headaches, nausea, numbness, rash, urinary symptoms, vertigo, visual change, vomiting or weakness. The symptoms are aggravated by twisting (Palpation). She has tried acetaminophen for the symptoms. The treatment provided no relief.       PMH:  has a past medical history of Allergy, Asthma, and Depression.  MEDS:   Current Outpatient Medications:     FLUoxetine (PROZAC) 20 MG Cap, Take 60 mg by mouth every day., Disp: , Rfl:   ALLERGIES:   Allergies   Allergen Reactions    Codeine Vomiting    Vicodin [Apap-Fd&C Yellow #10 [place]-Hydrocodone] Vomiting     SURGHX:   Past Surgical History:   Procedure Laterality Date    [place] LAP,DIAGNOSTIC ABDOMEN N/A 5/12/2022    Procedure: PELVISCOPY;  Surgeon: Christine Uribe M.D.;  Location: SURGERY SAME DAY [facility];  Service: Obstetrics    OVARIAN CYSTECTOMY Left 5/12/2022    Procedure: EXCISION, CYST, OVARY;  Surgeon: Christine Uribe M.D.;  Location: SURGERY SAME DAY [facility];  Service: Obstetrics    OTHER ORTHOPEDIC SURGERY  2010    mensicus torn, repaired    OTHER      Tonsil removal     SOCHX:  reports that she has never smoked. She has never used smokeless tobacco. She reports that she does not currently use alcohol. She reports that she does not use drugs.  FH: family history includes Cancer in her mother; Diabetes in her father; Hyperlipidemia in  "her father; No Known Problems in her maternal grandfather, maternal grandmother, paternal grandfather, and paternal grandmother; Other in her brother.      Review of Systems   Constitutional: Negative.  Negative for chills, fatigue and fever.   HENT: Negative.  Negative for congestion.    Eyes: Negative.    Respiratory: Negative.  Negative for cough.    Cardiovascular: Negative.  Negative for chest pain.   Gastrointestinal: Negative.  Negative for abdominal pain, change in bowel habit, nausea and vomiting.   Genitourinary: Negative.    Musculoskeletal: Negative.         Right chest wall pain   Skin:  Negative for rash.   Neurological: Negative.  Negative for vertigo, weakness, numbness and headaches.       Medications, Allergies, and current problem list reviewed today in Epic             Objective     /70 (BP Location: Left arm, Patient Position: Sitting, BP Cuff Size: Adult)   Pulse 73   Temp 36.7 °C (98 °F) (Temporal)   Resp 15   Ht 1.6 m (5' 3\") Comment: Pt reported  Wt 59 kg (130 lb)   SpO2 100%   BMI 23.03 kg/m²      Physical Exam  Vitals and nursing note reviewed. Exam conducted with a chaperone present.   Constitutional:       General: She is not in acute distress.     Appearance: Normal appearance. She is well-developed. She is not ill-appearing, toxic-appearing or diaphoretic.   HENT:      Head: Normocephalic and atraumatic.      Right Ear: Tympanic membrane, ear canal and external ear normal.      Left Ear: Tympanic membrane, ear canal and external ear normal.      Nose: Nose normal. No congestion or rhinorrhea.      Mouth/Throat:      Mouth: Mucous membranes are moist.      Pharynx: No oropharyngeal exudate or posterior oropharyngeal erythema.   Eyes:      General:         Right eye: No discharge.         Left eye: No discharge.      Conjunctiva/sclera: Conjunctivae normal.   Cardiovascular:      Rate and Rhythm: Normal rate and regular rhythm.      Pulses: Normal pulses.      Heart sounds: " Normal heart sounds.   Pulmonary:      Effort: Pulmonary effort is normal. No respiratory distress.      Breath sounds: Normal breath sounds. No stridor. No wheezing, rhonchi or rales.   Chest:      Chest wall: Tenderness present. No deformity, swelling, crepitus or edema.   Breasts:     Right: Normal.      Left: Normal.          Comments: There is a overlying pigmented lesion noted but patient states this has been there for years with no change.  Abdominal:      General: Abdomen is flat. There is no distension.      Tenderness: There is no abdominal tenderness. There is no right CVA tenderness, left CVA tenderness, guarding or rebound.   Musculoskeletal:      Cervical back: Normal range of motion and neck supple.      Right lower leg: No edema.      Left lower leg: No edema.   Lymphadenopathy:      Cervical: No cervical adenopathy.   Skin:     General: Skin is warm and dry.      Findings: Lesion (Overlying pigmented lesion but patient states it has been there for years with no change) present. No rash.   Neurological:      General: No focal deficit present.      Mental Status: She is alert and oriented to person, place, and time. Mental status is at baseline.   Psychiatric:         Mood and Affect: Mood normal.         Behavior: Behavior normal.         Thought Content: Thought content normal.         Judgment: Judgment normal.                         Assessment & Plan     This is a very pleasant 28-year-old female presenting with right lateral inferior chest wall/rib tenderness for the past 2 to 3 weeks.  Symptoms are not worsening but they are stagnant.  There is no injury fall or precipitating event.  There is no gross deformity, erythema, or ecchymosis.  She denies shortness of breath, chest pain, wheezing, fever, leg swelling, calf tenderness, hemoptysis.  Patient has no pertinent past medical history and takes no medications aside from Prozac.  She does not take OCP or smoke.  No pertinent past  cardiopulmonary history including asthma, pneumonia, DVT/PE.  Eating and drinking normal without nausea, vomiting, diarrhea, constipation or bloody stools.  Vital signs normal and pO2 is 100%.  Exam performed in the presence of a female chaperone.  There is reproducible right anterior and lateral chest wall TTP without deformity, crepitus, swelling, erythema or ecchymosis.  There is an overlying pigmented lesion but patient states it has been there for years and is unchanged.  CTA bilaterally without wheezing, rhonchi, rales or stridor.  Abdominal exam benign.  Remainder of exam reassuring/unremarkable.  Chest x-ray with right rib series within normal limits.  Unclear etiology but patient is young, healthy with no pertinent past medical history or discernible risk factors.  She will be treated for chest wall pain and discomfort with OTC meds, ice and rest.  Strict ER and red flag symptoms pertaining to acute cardiopulmonary etiology reiterated at length.      1. Rib pain on right side  [id number] (WITH 1-VIEW CXR) RIGHT          I personally reviewed prior external notes and test results pertinent to today's visit. Return to clinic or go to ED if symptoms worsen or persist. Red flag symptoms and indications for ED discussed at length. Patient/Parent/Guardian voices understanding.  AVS with post-visit instructions printed and provided or given verbally.  Follow-up with your primary care provider in 3-5 days. All side effects and potential interactions of prescribed medication discussed including allergic response, GI upset, tendon injury, rash, sedation, OCP effectiveness, etc.    Please note that this dictation was created using voice recognition software. I have made every reasonable attempt to correct obvious errors, but I expect that there are errors of grammar and possibly content that I did not discover before finalizing the note.

## 2024-06-06 ASSESSMENT — ENCOUNTER SYMPTOMS
VOMITING: 0
VISUAL CHANGE: 0
NAUSEA: 0
VERTIGO: 0
NUMBNESS: 0
HEADACHES: 0
CHILLS: 0
COUGH: 0

## 2024-06-21 ENCOUNTER — APPOINTMENT (OUTPATIENT)
Dept: MEDICAL GROUP | Facility: LAB | Age: 29
End: 2024-06-21
Payer: COMMERCIAL

## 2024-06-21 VITALS
TEMPERATURE: 96.4 F | RESPIRATION RATE: 16 BRPM | OXYGEN SATURATION: 98 % | SYSTOLIC BLOOD PRESSURE: 114 MMHG | HEART RATE: 87 BPM | WEIGHT: 130.07 LBS | HEIGHT: 63 IN | DIASTOLIC BLOOD PRESSURE: 60 MMHG | BODY MASS INDEX: 23.05 KG/M2

## 2024-06-21 DIAGNOSIS — Z13.220 LIPID SCREENING: ICD-10-CM

## 2024-06-21 DIAGNOSIS — Z91.89 AT RISK FOR BREAST CANCER: ICD-10-CM

## 2024-06-21 DIAGNOSIS — Z76.89 ENCOUNTER TO ESTABLISH CARE: ICD-10-CM

## 2024-06-21 DIAGNOSIS — Z13.29 SCREENING FOR THYROID DISORDER: ICD-10-CM

## 2024-06-21 DIAGNOSIS — Z76.89 SLEEP CONCERN: ICD-10-CM

## 2024-06-21 DIAGNOSIS — Z11.59 NEED FOR HEPATITIS C SCREENING TEST: ICD-10-CM

## 2024-06-21 PROCEDURE — 3078F DIAST BP <80 MM HG: CPT | Performed by: PHYSICIAN ASSISTANT

## 2024-06-21 PROCEDURE — 99395 PREV VISIT EST AGE 18-39: CPT | Performed by: PHYSICIAN ASSISTANT

## 2024-06-21 PROCEDURE — 3074F SYST BP LT 130 MM HG: CPT | Performed by: PHYSICIAN ASSISTANT

## 2024-06-21 RX ORDER — HYDROXYZINE HYDROCHLORIDE 25 MG/1
25 TABLET, FILM COATED ORAL NIGHTLY
Qty: 90 TABLET | Refills: 0 | Status: SHIPPED | OUTPATIENT
Start: 2024-06-21

## 2024-06-21 ASSESSMENT — PATIENT HEALTH QUESTIONNAIRE - PHQ9: CLINICAL INTERPRETATION OF PHQ2 SCORE: 0

## 2024-06-21 NOTE — PROGRESS NOTES
Subjective:     CC:  There were no encounter diagnoses.    HISTORY OF THE PRESENT ILLNESS: Patient is a 28 y.o. female. This pleasant patient is here today to establish car    Mom dx with breast cancer age 45, no prev BRCA   -no other family hx of breast, ovarian or uterine cancer    Sleep Quality  Alesia is 2.5 years old, sleeping through the night now  -chronic hx of poor sleep, usually frequent awakening  -prev tried medication for sleep (clonazepam)  -denies snoring or gasping in sleep      Works as a therapist    Health Maintenance     - Dyslipidemia (30-45): ordered  - Diabetes (HTN, HLD, BMI >25): ordered  - Depression screening (PHQ-2 and/or PHQ-9): neg  - Dental: UTD  - Eye: UTD  Diet: regular  Exercise: sedentary  Sleep: see above  Substance Use: denies  Tobacco Use/counseling: denies  Safe in relationship: yes       Cancer screening  - Cervical CA (21-65): due,will schedule  -  HX Abnormal pap/HPV: ASCUS with high risk Pap  - Skin cancer screening: sun avoidance     Infectious disease screening/Immunizations  --STI/HIV Screening: completed  --Practices safe sex.  --Hepatitis C Screening (18 to 80 yo): ordered  --Immunizations: declines PCV 20, otherwise UTD      Current Outpatient Medications Ordered in Epic   Medication Sig Dispense Refill    FLUoxetine (PROZAC) 20 MG Cap Take 60 mg by mouth every day.       No current [facility]-ordered facility-administered medications on file.     ROS:   Gen: no fevers/chills, no changes in weight  Eyes: no changes in vision  ENT: no sore throat, no hearing loss, no bloody nose  Pulm: no sob, no cough  CV: no chest pain, no palpitations  GI: no nausea/vomiting, no diarrhea  : no dysuria  MSk: no myalgias  Skin: no rash  Neuro: no headaches, no numbness/tingling  Heme/Lymph: no easy bruising      Objective:       Exam: /60 (BP Location: Right arm, Patient Position: Sitting, BP Cuff Size: Adult)   Pulse 87   Temp (!) 35.8 °C (96.4 °F) (Temporal)   Resp 16   Ht  "1.6 m (5' 3\")   Wt 59 kg (130 lb 1.1 oz)   SpO2 98%  Body mass index is 23.04 kg/m².    General: Normal appearing. No distress.  HEENT: Normocephalic. Eyes conjunctiva clear lids without ptosis, pupils equal and reactive to light accommodation, ears normal shape and contour, canals are clear bilaterally, tympanic membranes are benign, nasal mucosa benign, oropharynx is without erythema, edema or exudates.   Neck: Supple without JVD or bruit. Thyroid is not enlarged.  Pulmonary: Clear to ausculation.  Normal effort. No rales, ronchi, or wheezing.  Cardiovascular: Regular rate and rhythm without murmur. Carotid and radial pulses are intact and equal bilaterally.  Abdomen: Soft, nontender, nondistended. Normal bowel sounds. Liver and spleen are not palpable  Neurologic: Grossly nonfocal  Lymph: No cervical or supraclavicular lymph nodes are palpable  Skin: Warm and dry.  No obvious lesions.  Musculoskeletal: Normal gait. No extremity cyanosis, clubbing, or edema.  Psych: Normal mood and affect. Alert and oriented x3. Judgment and insight is normal.      Assessment & Plan:   28 y.o. female with the following -    1. Encounter to establish care  Labs per orders  Vaccinations per orders  Pt is due for preventative screenings: pap, will schedule  Screenings per orders      2. At risk for breast cancer  - Referral to Genetic Research Studies    3. Sleep concern  Chronic condition  Discussed several different treatment options for interrupted sleep cycle.  Will proceed with trial of low-dose of hydroxyzine taken 30 to 40 minutes before bed.  Alternative options would include trazodone 50 mg nightly and/or CBT-I.  Patient will follow-up for reassessment as needed    4. Lipid screening  - CBC WITH DIFFERENTIAL; Future  - Comp Metabolic Panel; Future  - Lipid Profile; Future    5. Screening for thyroid disorder  - TSH WITH REFLEX TO FT4; Future    6. Need for hepatitis C screening test  - HEP C VIRUS ANTIBODY; Future      I " spent a total of 20 minutes with record review, exam, communication with the patient, communication with other providers, and documentation of this encounter.    No follow-ups on file.    Please note that this dictation was created using voice recognition software. I have made every reasonable attempt to correct obvious errors, but I expect that there are errors of grammar and possibly content that I did not discover before finalizing the note.

## 2024-11-06 ENCOUNTER — APPOINTMENT (OUTPATIENT)
Dept: MEDICAL GROUP | Facility: LAB | Age: 29
End: 2024-11-06
Payer: COMMERCIAL

## (undated) DEVICE — GLOVE BIOGEL SZ 6 PF LATEX - (50EA/BX 4BX/CA)

## (undated) DEVICE — SUCTION INSTRUMENT YANKAUER BULBOUS TIP W/O VENT (50EA/CA)

## (undated) DEVICE — SUTURE 0 VICRYL PLUS UR-6 - 27 INCH (36/BX)

## (undated) DEVICE — MASK ANESTHESIA ADULT  - (100/CA)

## (undated) DEVICE — LACTATED RINGERS INJ 1000 ML - (14EA/CA 60CA/PF)

## (undated) DEVICE — GOWN SURGEONS X-LARGE - DISP. (30/CA)

## (undated) DEVICE — PROTECTOR ULNA NERVE - (36PR/CA)

## (undated) DEVICE — TUBING CLEARLINK DUO-VENT - C-FLO (48EA/CA)

## (undated) DEVICE — UTERINE MANIP RUMI 6.7X6 - (5/BX)

## (undated) DEVICE — TRAY SKIN SCRUB PVP WET (20EA/CA) PART #DYND70356 DISCONTINUED

## (undated) DEVICE — CANISTER SUCTION 3000ML MECHANICAL FILTER AUTO SHUTOFF MEDI-VAC NONSTERILE LF DISP  (40EA/CA)

## (undated) DEVICE — GOWN WARMING STANDARD FLEX - (30/CA)

## (undated) DEVICE — SYRINGE NON SAFETY 5 CC 20 GA X 1-1/2 IN (100/BX 4BX/CA)

## (undated) DEVICE — LIGASURE LAPAROSCOPIC 5MM - (6EA/CA)

## (undated) DEVICE — SODIUM CHL IRRIGATION 0.9% 1000ML (12EA/CA)

## (undated) DEVICE — TROCAR Z THREAD 11 X 100 - BLADED (6/BX)

## (undated) DEVICE — PAD SANITARY 11IN MAXI IND WRAPPED  (12EA/PK 24PK/CA)

## (undated) DEVICE — CHLORAPREP 26 ML APPLICATOR - ORANGE TINT(25/CA)

## (undated) DEVICE — GLOVE BIOGEL PI INDICATOR SZ 6.5 SURGICAL PF LF - (50/BX 4BX/CA)

## (undated) DEVICE — DERMABOND ADVANCED - (12EA/BX)

## (undated) DEVICE — WATER IRRIGATION STERILE 1000ML (12EA/CA)

## (undated) DEVICE — BLADE SURGICAL #11 - (50/BX)

## (undated) DEVICE — MANIFOLD NEPTUNE 1 PORT (20/PK)

## (undated) DEVICE — SUTURE 4-0 VICRYL PLUS FS-2 - 27 INCH (36/BX)

## (undated) DEVICE — TUBE CONNECTING SUCTION - CLEAR PLASTIC STERILE 72 IN (50EA/CA)

## (undated) DEVICE — TROCAR 5X100 NON BLADED Z-TH - READ KII (6/BX)

## (undated) DEVICE — TROCAR LAPSCP 100MM 12MM NTHRD - (6/BX)

## (undated) DEVICE — HEAD HOLDER JUNIOR/ADULT

## (undated) DEVICE — SUTURE GENERAL

## (undated) DEVICE — LIGASURE 5MM BLUNT TIP LONG - 44CM (6EA/PK)

## (undated) DEVICE — ARMREST CRADLE FOAM - (2PR/PK 12PR/CA)

## (undated) DEVICE — GLOVE SZ 7.5 BIOGEL PI MICRO - PF LF (50PR/BX)

## (undated) DEVICE — TRAY FOLEY CATHETER STATLOCK 16FR SURESTEP  (10EA/CA)

## (undated) DEVICE — KIT  I.V. START (100EA/CA)

## (undated) DEVICE — ADHESIVE DERMABOND HVD MINI (12EA/BX)

## (undated) DEVICE — TUBE E-T HI-LO CUFF 6.5MM (10EA/BX)

## (undated) DEVICE — SYRINGE 30 ML LL (56/BX)

## (undated) DEVICE — CANNULA W/SEAL 5X100 Z-THRE - ADED KII (12/BX)

## (undated) DEVICE — PACK LAPAROSCOPY - (1/CA)

## (undated) DEVICE — TROCAR 5X100 SEPARTATOR ADV - FIXATION (6/BX)

## (undated) DEVICE — TRAY SRGPRP PVP IOD WT PRP - (20/CA)

## (undated) DEVICE — KIT ANESTHESIA W/CIRCUIT & 3/LT BAG W/FILTER (20EA/CA)

## (undated) DEVICE — GLOVE SZ 6.5 BIOGEL PI MICRO - PF LF (50PR/BX)

## (undated) DEVICE — TOWEL STOP TIMEOUT SAFETY FLAG (40EA/CA)

## (undated) DEVICE — CATHETER IV 20 GA X 1-1/4 ---SURG.& SDS ONLY--- (50EA/BX)

## (undated) DEVICE — CANISTER SUCTION RIGID RED 1500CC (40EA/CA)

## (undated) DEVICE — SLEEVE VASO CALF MED - (10PR/CA)

## (undated) DEVICE — SET LEADWIRE 5 LEAD BEDSIDE DISPOSABLE ECG (1SET OF 5/EA)

## (undated) DEVICE — ELECTRODE 850 FOAM ADHESIVE - HYDROGEL RADIOTRNSPRNT (50/PK)